# Patient Record
Sex: FEMALE | Race: WHITE | NOT HISPANIC OR LATINO | Employment: PART TIME | URBAN - METROPOLITAN AREA
[De-identification: names, ages, dates, MRNs, and addresses within clinical notes are randomized per-mention and may not be internally consistent; named-entity substitution may affect disease eponyms.]

---

## 2022-08-02 ENCOUNTER — APPOINTMENT (OUTPATIENT)
Dept: CT IMAGING | Facility: CLINIC | Age: 22
End: 2022-08-02
Attending: EMERGENCY MEDICINE
Payer: COMMERCIAL

## 2022-08-02 ENCOUNTER — HOSPITAL ENCOUNTER (EMERGENCY)
Facility: CLINIC | Age: 22
Discharge: HOME | End: 2022-08-03
Attending: EMERGENCY MEDICINE | Admitting: EMERGENCY MEDICINE
Payer: COMMERCIAL

## 2022-08-02 DIAGNOSIS — N10 PYELONEPHRITIS, ACUTE: ICD-10-CM

## 2022-08-02 LAB
ALBUMIN UR-MCNC: 20 MG/DL
APPEARANCE UR: ABNORMAL
BASOPHILS # BLD AUTO: 0 10E3/UL (ref 0–0.2)
BASOPHILS NFR BLD AUTO: 0 %
BILIRUB UR QL STRIP: NEGATIVE
COLOR UR AUTO: YELLOW
EOSINOPHIL # BLD AUTO: 0 10E3/UL (ref 0–0.7)
EOSINOPHIL NFR BLD AUTO: 0 %
ERYTHROCYTE [DISTWIDTH] IN BLOOD BY AUTOMATED COUNT: 11.9 % (ref 10–15)
GLUCOSE UR STRIP-MCNC: NEGATIVE MG/DL
HCG INTACT+B SERPL-ACNC: <1 MIU/ML
HCT VFR BLD AUTO: 46.6 % (ref 35–47)
HGB BLD-MCNC: 14.6 G/DL (ref 11.7–15.7)
HGB UR QL STRIP: NEGATIVE
IMM GRANULOCYTES # BLD: 0.1 10E3/UL
IMM GRANULOCYTES NFR BLD: 0 %
KETONES UR STRIP-MCNC: ABNORMAL MG/DL
LEUKOCYTE ESTERASE UR QL STRIP: ABNORMAL
LYMPHOCYTES # BLD AUTO: 0.8 10E3/UL (ref 0.8–5.3)
LYMPHOCYTES NFR BLD AUTO: 5 %
MCH RBC QN AUTO: 28.7 PG (ref 26.5–33)
MCHC RBC AUTO-ENTMCNC: 31.3 G/DL (ref 31.5–36.5)
MCV RBC AUTO: 92 FL (ref 78–100)
MONOCYTES # BLD AUTO: 0.9 10E3/UL (ref 0–1.3)
MONOCYTES NFR BLD AUTO: 5 %
MUCOUS THREADS #/AREA URNS LPF: PRESENT /LPF
NEUTROPHILS # BLD AUTO: 16 10E3/UL (ref 1.6–8.3)
NEUTROPHILS NFR BLD AUTO: 90 %
NITRATE UR QL: NEGATIVE
NRBC # BLD AUTO: 0 10E3/UL
NRBC BLD AUTO-RTO: 0 /100
PH UR STRIP: 7.5 [PH] (ref 5–7)
PLATELET # BLD AUTO: 237 10E3/UL (ref 150–450)
RBC # BLD AUTO: 5.08 10E6/UL (ref 3.8–5.2)
RBC URINE: 2 /HPF
SP GR UR STRIP: 1.02 (ref 1–1.03)
SQUAMOUS EPITHELIAL: 13 /HPF
UROBILINOGEN UR STRIP-MCNC: NORMAL MG/DL
WBC # BLD AUTO: 17.8 10E3/UL (ref 4–11)
WBC URINE: 48 /HPF

## 2022-08-02 PROCEDURE — 96361 HYDRATE IV INFUSION ADD-ON: CPT

## 2022-08-02 PROCEDURE — 96375 TX/PRO/DX INJ NEW DRUG ADDON: CPT

## 2022-08-02 PROCEDURE — 87086 URINE CULTURE/COLONY COUNT: CPT | Performed by: EMERGENCY MEDICINE

## 2022-08-02 PROCEDURE — 258N000003 HC RX IP 258 OP 636: Performed by: EMERGENCY MEDICINE

## 2022-08-02 PROCEDURE — 81001 URINALYSIS AUTO W/SCOPE: CPT | Performed by: EMERGENCY MEDICINE

## 2022-08-02 PROCEDURE — 250N000011 HC RX IP 250 OP 636: Performed by: EMERGENCY MEDICINE

## 2022-08-02 PROCEDURE — 80053 COMPREHEN METABOLIC PANEL: CPT | Performed by: EMERGENCY MEDICINE

## 2022-08-02 PROCEDURE — 84702 CHORIONIC GONADOTROPIN TEST: CPT | Performed by: EMERGENCY MEDICINE

## 2022-08-02 PROCEDURE — 85025 COMPLETE CBC W/AUTO DIFF WBC: CPT | Performed by: EMERGENCY MEDICINE

## 2022-08-02 PROCEDURE — 74176 CT ABD & PELVIS W/O CONTRAST: CPT

## 2022-08-02 PROCEDURE — 36415 COLL VENOUS BLD VENIPUNCTURE: CPT | Performed by: EMERGENCY MEDICINE

## 2022-08-02 PROCEDURE — 99285 EMERGENCY DEPT VISIT HI MDM: CPT | Mod: 25

## 2022-08-02 RX ORDER — HYDROMORPHONE HYDROCHLORIDE 1 MG/ML
0.5 INJECTION, SOLUTION INTRAMUSCULAR; INTRAVENOUS; SUBCUTANEOUS
Status: DISCONTINUED | OUTPATIENT
Start: 2022-08-02 | End: 2022-08-03 | Stop reason: HOSPADM

## 2022-08-02 RX ORDER — CEFTRIAXONE 1 G/1
1 INJECTION, POWDER, FOR SOLUTION INTRAMUSCULAR; INTRAVENOUS ONCE
Status: COMPLETED | OUTPATIENT
Start: 2022-08-02 | End: 2022-08-03

## 2022-08-02 RX ORDER — SODIUM CHLORIDE 9 MG/ML
INJECTION, SOLUTION INTRAVENOUS CONTINUOUS
Status: DISCONTINUED | OUTPATIENT
Start: 2022-08-02 | End: 2022-08-03 | Stop reason: HOSPADM

## 2022-08-02 RX ORDER — ONDANSETRON 2 MG/ML
4 INJECTION INTRAMUSCULAR; INTRAVENOUS EVERY 30 MIN PRN
Status: DISCONTINUED | OUTPATIENT
Start: 2022-08-02 | End: 2022-08-03 | Stop reason: HOSPADM

## 2022-08-02 RX ADMIN — HYDROMORPHONE HYDROCHLORIDE 0.5 MG: 1 INJECTION, SOLUTION INTRAMUSCULAR; INTRAVENOUS; SUBCUTANEOUS at 22:27

## 2022-08-02 RX ADMIN — ONDANSETRON 4 MG: 2 INJECTION INTRAMUSCULAR; INTRAVENOUS at 22:27

## 2022-08-02 RX ADMIN — SODIUM CHLORIDE 1000 ML: 9 INJECTION, SOLUTION INTRAVENOUS at 22:24

## 2022-08-02 ASSESSMENT — ENCOUNTER SYMPTOMS
VOMITING: 1
FEVER: 1
FLANK PAIN: 1

## 2022-08-03 VITALS
SYSTOLIC BLOOD PRESSURE: 103 MMHG | HEART RATE: 95 BPM | TEMPERATURE: 98.4 F | RESPIRATION RATE: 15 BRPM | OXYGEN SATURATION: 97 % | DIASTOLIC BLOOD PRESSURE: 59 MMHG

## 2022-08-03 LAB
ALBUMIN SERPL BCG-MCNC: 4.7 G/DL (ref 3.5–5.2)
ALP SERPL-CCNC: 83 U/L (ref 35–104)
ALT SERPL W P-5'-P-CCNC: 12 U/L (ref 10–35)
ANION GAP SERPL CALCULATED.3IONS-SCNC: 10 MMOL/L (ref 7–15)
AST SERPL W P-5'-P-CCNC: 17 U/L (ref 10–35)
BILIRUB SERPL-MCNC: 0.4 MG/DL
BUN SERPL-MCNC: 9.3 MG/DL (ref 6–20)
CALCIUM SERPL-MCNC: 9.4 MG/DL (ref 8.6–10)
CHLORIDE SERPL-SCNC: 102 MMOL/L (ref 98–107)
CREAT SERPL-MCNC: 0.72 MG/DL (ref 0.51–0.95)
DEPRECATED HCO3 PLAS-SCNC: 25 MMOL/L (ref 22–29)
GFR SERPL CREATININE-BSD FRML MDRD: >90 ML/MIN/1.73M2
GLUCOSE SERPL-MCNC: 118 MG/DL (ref 70–99)
POTASSIUM SERPL-SCNC: 4.5 MMOL/L (ref 3.4–5.3)
PROT SERPL-MCNC: 7.5 G/DL (ref 6.4–8.3)
SODIUM SERPL-SCNC: 137 MMOL/L (ref 136–145)

## 2022-08-03 PROCEDURE — 96365 THER/PROPH/DIAG IV INF INIT: CPT

## 2022-08-03 PROCEDURE — 250N000011 HC RX IP 250 OP 636: Performed by: EMERGENCY MEDICINE

## 2022-08-03 RX ORDER — CIPROFLOXACIN 500 MG/1
500 TABLET, FILM COATED ORAL 2 TIMES DAILY
Qty: 20 TABLET | Refills: 0 | Status: SHIPPED | OUTPATIENT
Start: 2022-08-03 | End: 2022-08-13

## 2022-08-03 RX ORDER — ONDANSETRON 4 MG/1
4 TABLET, ORALLY DISINTEGRATING ORAL EVERY 6 HOURS PRN
Qty: 10 TABLET | Refills: 0 | Status: SHIPPED | OUTPATIENT
Start: 2022-08-03 | End: 2022-08-06

## 2022-08-03 RX ADMIN — CEFTRIAXONE 1 G: 1 INJECTION, POWDER, FOR SOLUTION INTRAMUSCULAR; INTRAVENOUS at 00:24

## 2022-08-03 NOTE — DISCHARGE INSTRUCTIONS
Your next dose of antibiotics tomorrow morning.  Please drink plenty fluids and rest.  You have been referred to a primary care clinic for follow-up.  Please schedule an appointment next 2 to 3 days.  Return if you have severe pain, high fever, intractable nausea and vomiting or any other concerns.

## 2022-08-03 NOTE — ED TRIAGE NOTES
Pt arrives to ED with generalized lower abd pain that radiates into both sides of her back. Pt thinks this is a kidney stone, but pain was typically on R side. Pt tried ibuprofen this morning without relief. Denies dysuria.

## 2022-08-03 NOTE — ED PROVIDER NOTES
History   Chief Complaint:  Abdominal Pain and Flank Pain       The history is provided by the patient.      Hilda Escamilla is a 22 year old female who presents with flank pain.The patient reports last night onset of flank pain and abdominal pain that radiates on both sides of her back. She states when she lived in [place] she had 2 kidney stones that she passed and one kidney stone she had surgery and a stent placed. She notes that she took ibuprofen at 0900 this morning but feel like it did not relieve the pain. She adds she vomited and may have had a fever. She denies possibility of being pregnant or any allergies.     Review of Systems   Constitutional: Positive for fever.   Gastrointestinal: Positive for vomiting.   Genitourinary: Positive for flank pain.   All other systems reviewed and are negative.    Allergies:  No Known Allergies    Medications:  No current outpatient medications on file.    Past Medical History:     There is no problem list on file for this patient.     Social History:  Patient presents with family member.   Patient arrives via private vehicle.     Physical Exam     Patient Vitals for the past 24 hrs:   BP Temp Temp src Pulse Resp SpO2   08/02/22 2012 (!) 146/83 -- -- (!) 121 20 100 %   08/02/22 2011 -- 98.4  F (36.9  C) Temporal -- 20 --       Physical Exam  Constitutional:       Appearance: She is well-developed.   HENT:      Right Ear: External ear normal.      Left Ear: External ear normal.      Mouth/Throat:      Mouth: Mucous membranes are dry.      Pharynx: Oropharynx is clear. No oropharyngeal exudate or posterior oropharyngeal erythema.   Eyes:      General: No scleral icterus.     Extraocular Movements: Extraocular movements intact.      Conjunctiva/sclera: Conjunctivae normal.      Pupils: Pupils are equal, round, and reactive to light.   Cardiovascular:      Rate and Rhythm: Normal rate and regular rhythm.      Heart sounds: Normal heart sounds. No murmur  heard.    No friction rub. No gallop.   Pulmonary:      Effort: Pulmonary effort is normal. No respiratory distress.      Breath sounds: Normal breath sounds. No wheezing or rales.   Abdominal:      General: Bowel sounds are normal. There is no distension.      Palpations: Abdomen is soft. There is no mass.      Tenderness: There is abdominal tenderness.      Comments: Mild suprapubic TTP. Mild bilateral flank TTP in the lower flanks   Musculoskeletal:         General: Normal range of motion.      Cervical back: Normal range of motion and neck supple.   Lymphadenopathy:      Cervical: No cervical adenopathy.   Skin:     General: Skin is warm and dry.      Capillary Refill: Capillary refill takes less than 2 seconds.      Findings: No rash.   Neurological:      Mental Status: She is alert.         Emergency Department Course     Imaging:  CT Abdomen Pelvis w/o Contrast   Final Result   IMPRESSION:    1.  No hydronephrosis or obstructing urinary tract calculus. Please note that noncontrast CT is insensitive to evaluate for changes of pyelonephritis.           Report per radiology    Laboratory:  Labs Ordered and Resulted from Time of ED Arrival to Time of ED Departure   ROUTINE UA WITH MICROSCOPIC REFLEX TO CULTURE - Abnormal       Result Value    Color Urine Yellow      Appearance Urine Slightly Cloudy (*)     Glucose Urine Negative      Bilirubin Urine Negative      Ketones Urine Trace (*)     Specific Gravity Urine 1.022      Blood Urine Negative      pH Urine 7.5 (*)     Protein Albumin Urine 20  (*)     Urobilinogen Urine Normal      Nitrite Urine Negative      Leukocyte Esterase Urine Large (*)     Mucus Urine Present (*)     RBC Urine 2      WBC Urine 48 (*)     Squamous Epithelials Urine 13 (*)    COMPREHENSIVE METABOLIC PANEL - Abnormal    Sodium 137      Potassium 4.5      Creatinine 0.72      Urea Nitrogen 9.3      Chloride 102      Carbon Dioxide (CO2) 25      Anion Gap 10      Glucose 118 (*)     Calcium  9.4      Protein Total 7.5      Albumin 4.7      Bilirubin Total 0.4      Alkaline Phosphatase 83      AST 17      ALT 12      GFR Estimate >90     CBC WITH PLATELETS AND DIFFERENTIAL - Abnormal    WBC Count 17.8 (*)     RBC Count 5.08      Hemoglobin 14.6      Hematocrit 46.6      MCV 92      MCH 28.7      MCHC 31.3 (*)     RDW 11.9      Platelet Count 237      % Neutrophils 90      % Lymphocytes 5      % Monocytes 5      % Eosinophils 0      % Basophils 0      % Immature Granulocytes 0      NRBCs per 100 WBC 0      Absolute Neutrophils 16.0 (*)     Absolute Lymphocytes 0.8      Absolute Monocytes 0.9      Absolute Eosinophils 0.0      Absolute Basophils 0.0      Absolute Immature Granulocytes 0.1      Absolute NRBCs 0.0     HCG QUANTITATIVE PREGNANCY - Normal    hCG Quantitative <1     URINE CULTURE          Emergency Department Course:    Reviewed:  I reviewed nursing notes, vitals and past medical history.    Assessments:  2225 I obtained history and examined the patient as noted above.   2348 I rechecked the patient and explained findings.     Interventions:  2224 NS, 1000 mL, IV  2227 Zofran, 4 mg, IV  2227 Dilaudid, 0.5 mg, IV  0024 Rocephin, 1 g, IV    Disposition:  The patient was discharged to home.     Impression & Plan       Medical Decision Making:  Patient presents today for evaluation of flank pain as well as urinary concerns.  Her labs did show infection her urine.  Her CT did not show any signs of kidney stone.  This is most likely a kidney infection given the degree of pain.  She was advised to stay rested and push fluids.  She is willing to try this as an outpatient.  She is given prescription for Zofran for nausea and Cipro for antibiotics.  Return precautions were reviewed in detail with her.  She voiced understanding.      Diagnosis:    ICD-10-CM    1. Pyelonephritis, acute  N10        Discharge Medications:  New Prescriptions    CIPROFLOXACIN (CIPRO) 500 MG TABLET    Take 1 tablet (500 mg) by  mouth 2 times daily for 10 days    ONDANSETRON (ZOFRAN ODT) 4 MG ODT TAB    Take 1 tablet (4 mg) by mouth every 6 hours as needed for nausea       Scribe Disclosure:  I, Fara Currie, am serving as a scribe at 10:22 PM on 8/2/2022 to document services personally performed by Antonia Gutierrez MD based on my observations and the provider's statements to me.            Antonia Gutierrez MD  08/03/22 0059

## 2022-08-04 LAB — BACTERIA UR CULT: NORMAL

## 2022-08-04 NOTE — RESULT ENCOUNTER NOTE
Final urine culture report is negative.  Adult Negative Urine culture parameters per protocol: Any # Urogenital single or mixed organism, <10,000 col/ml single organism (cath/midstream), and > 3 organisms (No susceptibilities performed).  [facility] Emergency Dept discharge antibiotic prescribed (If applicable): Ciprofloxacin  Treatment recommendations per [facility] ED Lab Result Urine Culture protocol.

## 2023-02-12 ENCOUNTER — HEALTH MAINTENANCE LETTER (OUTPATIENT)
Age: 23
End: 2023-02-12

## 2023-08-11 ENCOUNTER — HOSPITAL ENCOUNTER (EMERGENCY)
Facility: CLINIC | Age: 23
Discharge: HOME | End: 2023-08-11
Attending: EMERGENCY MEDICINE | Admitting: EMERGENCY MEDICINE
Payer: COMMERCIAL

## 2023-08-11 ENCOUNTER — APPOINTMENT (OUTPATIENT)
Dept: CT IMAGING | Facility: CLINIC | Age: 23
End: 2023-08-11
Attending: EMERGENCY MEDICINE
Payer: COMMERCIAL

## 2023-08-11 VITALS
OXYGEN SATURATION: 99 % | DIASTOLIC BLOOD PRESSURE: 87 MMHG | TEMPERATURE: 97 F | HEART RATE: 70 BPM | SYSTOLIC BLOOD PRESSURE: 147 MMHG | RESPIRATION RATE: 18 BRPM

## 2023-08-11 DIAGNOSIS — N12 PYELONEPHRITIS: ICD-10-CM

## 2023-08-11 DIAGNOSIS — N39.0 URINARY TRACT INFECTION WITHOUT HEMATURIA, SITE UNSPECIFIED: ICD-10-CM

## 2023-08-11 LAB
ALBUMIN SERPL BCG-MCNC: 4.6 G/DL (ref 3.5–5.2)
ALBUMIN UR-MCNC: 20 MG/DL
ALP SERPL-CCNC: 72 U/L (ref 35–104)
ALT SERPL W P-5'-P-CCNC: 12 U/L (ref 0–50)
ANION GAP SERPL CALCULATED.3IONS-SCNC: 10 MMOL/L (ref 7–15)
APPEARANCE UR: ABNORMAL
AST SERPL W P-5'-P-CCNC: 19 U/L (ref 0–45)
BACTERIA #/AREA URNS HPF: ABNORMAL /HPF
BASOPHILS # BLD AUTO: 0 10E3/UL (ref 0–0.2)
BASOPHILS NFR BLD AUTO: 0 %
BILIRUB SERPL-MCNC: 0.5 MG/DL
BILIRUB UR QL STRIP: NEGATIVE
BUN SERPL-MCNC: 9.6 MG/DL (ref 6–20)
CALCIUM SERPL-MCNC: 10.2 MG/DL (ref 8.6–10)
CHLORIDE SERPL-SCNC: 104 MMOL/L (ref 98–107)
COLOR UR AUTO: YELLOW
CREAT SERPL-MCNC: 0.77 MG/DL (ref 0.51–0.95)
DEPRECATED HCO3 PLAS-SCNC: 25 MMOL/L (ref 22–29)
EOSINOPHIL # BLD AUTO: 0.2 10E3/UL (ref 0–0.7)
EOSINOPHIL NFR BLD AUTO: 2 %
ERYTHROCYTE [DISTWIDTH] IN BLOOD BY AUTOMATED COUNT: 12.4 % (ref 10–15)
GFR SERPL CREATININE-BSD FRML MDRD: >90 ML/MIN/1.73M2
GLUCOSE SERPL-MCNC: 89 MG/DL (ref 70–99)
GLUCOSE UR STRIP-MCNC: NEGATIVE MG/DL
HCG SER QL IA.RAPID: NEGATIVE
HCT VFR BLD AUTO: 43.2 % (ref 35–47)
HGB BLD-MCNC: 14.3 G/DL (ref 11.7–15.7)
HGB UR QL STRIP: NEGATIVE
HOLD SPECIMEN: NORMAL
IMM GRANULOCYTES # BLD: 0 10E3/UL
IMM GRANULOCYTES NFR BLD: 0 %
KETONES UR STRIP-MCNC: 40 MG/DL
LEUKOCYTE ESTERASE UR QL STRIP: ABNORMAL
LIPASE SERPL-CCNC: 16 U/L (ref 13–60)
LYMPHOCYTES # BLD AUTO: 1.9 10E3/UL (ref 0.8–5.3)
LYMPHOCYTES NFR BLD AUTO: 24 %
MAGNESIUM SERPL-MCNC: 2 MG/DL (ref 1.7–2.3)
MCH RBC QN AUTO: 29.3 PG (ref 26.5–33)
MCHC RBC AUTO-ENTMCNC: 33.1 G/DL (ref 31.5–36.5)
MCV RBC AUTO: 89 FL (ref 78–100)
MONOCYTES # BLD AUTO: 0.6 10E3/UL (ref 0–1.3)
MONOCYTES NFR BLD AUTO: 7 %
MUCOUS THREADS #/AREA URNS LPF: PRESENT /LPF
NEUTROPHILS # BLD AUTO: 5.2 10E3/UL (ref 1.6–8.3)
NEUTROPHILS NFR BLD AUTO: 67 %
NITRATE UR QL: NEGATIVE
NRBC # BLD AUTO: 0 10E3/UL
NRBC BLD AUTO-RTO: 0 /100
PH UR STRIP: 6.5 [PH] (ref 5–7)
PLATELET # BLD AUTO: 234 10E3/UL (ref 150–450)
POTASSIUM SERPL-SCNC: 4.5 MMOL/L (ref 3.4–5.3)
PROT SERPL-MCNC: 7.6 G/DL (ref 6.4–8.3)
RBC # BLD AUTO: 4.88 10E6/UL (ref 3.8–5.2)
RBC URINE: 5 /HPF
SODIUM SERPL-SCNC: 139 MMOL/L (ref 136–145)
SP GR UR STRIP: 1.02 (ref 1–1.03)
SQUAMOUS EPITHELIAL: 5 /HPF
UROBILINOGEN UR STRIP-MCNC: 2 MG/DL
WBC # BLD AUTO: 7.8 10E3/UL (ref 4–11)
WBC URINE: 68 /HPF

## 2023-08-11 PROCEDURE — 99285 EMERGENCY DEPT VISIT HI MDM: CPT | Mod: 25

## 2023-08-11 PROCEDURE — 87088 URINE BACTERIA CULTURE: CPT | Performed by: EMERGENCY MEDICINE

## 2023-08-11 PROCEDURE — 96365 THER/PROPH/DIAG IV INF INIT: CPT | Mod: 59

## 2023-08-11 PROCEDURE — 96361 HYDRATE IV INFUSION ADD-ON: CPT

## 2023-08-11 PROCEDURE — 80053 COMPREHEN METABOLIC PANEL: CPT | Performed by: EMERGENCY MEDICINE

## 2023-08-11 PROCEDURE — 258N000003 HC RX IP 258 OP 636: Performed by: EMERGENCY MEDICINE

## 2023-08-11 PROCEDURE — 250N000011 HC RX IP 250 OP 636: Mod: JZ | Performed by: EMERGENCY MEDICINE

## 2023-08-11 PROCEDURE — 84703 CHORIONIC GONADOTROPIN ASSAY: CPT

## 2023-08-11 PROCEDURE — 74177 CT ABD & PELVIS W/CONTRAST: CPT

## 2023-08-11 PROCEDURE — 83735 ASSAY OF MAGNESIUM: CPT | Performed by: EMERGENCY MEDICINE

## 2023-08-11 PROCEDURE — 96375 TX/PRO/DX INJ NEW DRUG ADDON: CPT

## 2023-08-11 PROCEDURE — 85025 COMPLETE CBC W/AUTO DIFF WBC: CPT | Performed by: EMERGENCY MEDICINE

## 2023-08-11 PROCEDURE — 83690 ASSAY OF LIPASE: CPT | Performed by: EMERGENCY MEDICINE

## 2023-08-11 PROCEDURE — 250N000011 HC RX IP 250 OP 636: Performed by: EMERGENCY MEDICINE

## 2023-08-11 PROCEDURE — 36415 COLL VENOUS BLD VENIPUNCTURE: CPT | Performed by: EMERGENCY MEDICINE

## 2023-08-11 PROCEDURE — 81001 URINALYSIS AUTO W/SCOPE: CPT | Performed by: EMERGENCY MEDICINE

## 2023-08-11 RX ORDER — CEFPODOXIME PROXETIL 200 MG/1
200 TABLET, FILM COATED ORAL 2 TIMES DAILY
Qty: 28 TABLET | Refills: 0 | Status: SHIPPED | OUTPATIENT
Start: 2023-08-11 | End: 2023-08-25

## 2023-08-11 RX ORDER — IOPAMIDOL 755 MG/ML
500 INJECTION, SOLUTION INTRAVASCULAR ONCE
Status: COMPLETED | OUTPATIENT
Start: 2023-08-11 | End: 2023-08-11

## 2023-08-11 RX ORDER — CEFTRIAXONE 1 G/1
1 INJECTION, POWDER, FOR SOLUTION INTRAMUSCULAR; INTRAVENOUS ONCE
Status: COMPLETED | OUTPATIENT
Start: 2023-08-11 | End: 2023-08-11

## 2023-08-11 RX ORDER — ONDANSETRON 2 MG/ML
4 INJECTION INTRAMUSCULAR; INTRAVENOUS ONCE
Status: COMPLETED | OUTPATIENT
Start: 2023-08-11 | End: 2023-08-11

## 2023-08-11 RX ADMIN — IOPAMIDOL 100 ML: 755 INJECTION, SOLUTION INTRAVENOUS at 11:28

## 2023-08-11 RX ADMIN — SODIUM CHLORIDE 1000 ML: 9 INJECTION, SOLUTION INTRAVENOUS at 10:14

## 2023-08-11 RX ADMIN — CEFTRIAXONE 1 G: 1 INJECTION, POWDER, FOR SOLUTION INTRAMUSCULAR; INTRAVENOUS at 12:31

## 2023-08-11 RX ADMIN — SODIUM CHLORIDE 100 ML: 9 INJECTION, SOLUTION INTRAVENOUS at 11:28

## 2023-08-11 RX ADMIN — ONDANSETRON 4 MG: 2 INJECTION INTRAMUSCULAR; INTRAVENOUS at 10:13

## 2023-08-11 ASSESSMENT — ACTIVITIES OF DAILY LIVING (ADL)
ADLS_ACUITY_SCORE: 35
ADLS_ACUITY_SCORE: 35

## 2023-08-11 NOTE — ED PROVIDER NOTES
History     Chief Complaint:  Nausea & Vomiting and Abdominal Pain     HPI   Hilda Escamilla is a 23 year old female with a history of PCOS who presents for evaluation of nausea, vomiting, and abdominal pain. The patient reports that for the past five months, she has had abdominal pain, nausea, vomiting, and cramping after waking up every morning. States that her abdominal pain is mostly in the right upper and right lower quadrant, but today is in the lower abd. Upon arrival to the emergency department, her pain was a 5.5/10 but has decreased to a 3/10 on the severity scale. Notes that her pain has been significantly worse this week leading her to go to urgent care twice and miss three days of work. Adds that she has been taking Zofran to manage her nausea and has a gastroenterology appointment in a few weeks. Reports her last menstrual period was on 7/25/23 and was heavier than normal with worse cramps. Denies urinary symptoms, fever, vaginal bleeding, vaginal discharge, and history of abdominal surgery.     Independent Historian:   None - Patient Only    Review of External Notes:   I reviewed laboratory work (UA and blood) from the patient's urgent care visit earlier this week.     Medications:    Ondansetron    Past Medical History:    PCOS    Physical Exam   Patient Vitals for the past 24 hrs:   BP Temp Temp src Pulse Resp SpO2   08/11/23 0906 (!) 138/100 97  F (36.1  C) Temporal 98 18 100 %      Physical Exam  Vitals reviewed.   Constitutional:       General: She is not in acute distress.     Appearance: She is not ill-appearing.   HENT:      Head: Normocephalic and atraumatic.   Eyes:      Extraocular Movements: Extraocular movements intact.   Cardiovascular:      Rate and Rhythm: Normal rate and regular rhythm.   Pulmonary:      Effort: Pulmonary effort is normal. No respiratory distress.      Breath sounds: Normal breath sounds. No wheezing.   Abdominal:      Palpations: Abdomen is soft.       Tenderness: There is no abdominal tenderness. There is no right CVA tenderness, left CVA tenderness or guarding.   Musculoskeletal:      Cervical back: Normal range of motion.   Skin:     General: Skin is warm and dry.      Capillary Refill: Capillary refill takes less than 2 seconds.   Neurological:      Mental Status: She is alert and oriented to person, place, and time.      GCS: GCS eye subscore is 4. GCS verbal subscore is 5. GCS motor subscore is 6.   Psychiatric:         Behavior: Behavior normal.           Emergency Department Course   Imaging:  CT Abdomen Pelvis w Contrast   Final Result   IMPRESSION: Findings consistent with cystitis and bilateral, left   greater than right pyelonephritis. No evidence of renal abscess.      LUIS GERBER MD            SYSTEM ID:  [id number]         Report per radiology    Laboratory:  Labs Ordered and Resulted from Time of ED Arrival to Time of ED Departure   COMPREHENSIVE METABOLIC PANEL - Abnormal       Result Value    Sodium 139      Potassium 4.5      Chloride 104      Carbon Dioxide (CO2) 25      Anion Gap 10      Urea Nitrogen 9.6      Creatinine 0.77      Calcium 10.2 (*)     Glucose 89      Alkaline Phosphatase 72      AST 19      ALT 12      Protein Total 7.6      Albumin 4.6      Bilirubin Total 0.5      GFR Estimate >90     ROUTINE UA WITH MICROSCOPIC REFLEX TO CULTURE - Abnormal    Color Urine Yellow      Appearance Urine Slightly Cloudy (*)     Glucose Urine Negative      Bilirubin Urine Negative      Ketones Urine 40 (*)     Specific Gravity Urine 1.021      Blood Urine Negative      pH Urine 6.5      Protein Albumin Urine 20 (*)     Urobilinogen Urine 2.0      Nitrite Urine Negative      Leukocyte Esterase Urine Large (*)     Bacteria Urine Few (*)     Mucus Urine Present (*)     RBC Urine 5 (*)     WBC Urine 68 (*)     Squamous Epithelials Urine 5 (*)    LIPASE - Normal    Lipase 16     MAGNESIUM - Normal    Magnesium 2.0     ISTAT HCG QUALITATIVE  PREGNANCY POCT - Normal    HCG Qualitative POCT Negative     CBC WITH PLATELETS AND DIFFERENTIAL    WBC Count 7.8      RBC Count 4.88      Hemoglobin 14.3      Hematocrit 43.2      MCV 89      MCH 29.3      MCHC 33.1      RDW 12.4      Platelet Count 234      % Neutrophils 67      % Lymphocytes 24      % Monocytes 7      % Eosinophils 2      % Basophils 0      % Immature Granulocytes 0      NRBCs per 100 WBC 0      Absolute Neutrophils 5.2      Absolute Lymphocytes 1.9      Absolute Monocytes 0.6      Absolute Eosinophils 0.2      Absolute Basophils 0.0      Absolute Immature Granulocytes 0.0      Absolute NRBCs 0.0     URINE CULTURE      Emergency Department Course & Assessments:      Interventions:  Medications   0.9% sodium chloride BOLUS (0 mLs Intravenous Stopped 8/11/23 1302)   ondansetron (ZOFRAN) injection 4 mg (4 mg Intravenous $Given 8/11/23 1013)   cefTRIAXone (ROCEPHIN) 1 g vial to attach to  mL bag for ADULTS or NS 50 mL bag for PEDS (0 g Intravenous Stopped 8/11/23 1302)   0.9% sodium chloride BOLUS (0 mLs Intravenous Stopped 8/11/23 1231)   iopamidol (ISOVUE-370) solution 500 mL (100 mLs Intravenous $Given 8/11/23 1128)      Assessments:  0955 I obtained history and examined the patient as noted above.   1214 I rechecked and updated the patient.   1232 I rechecked and updated the patient.   1301 I rechecked and updated the patient.     Independent Interpretation (X-rays, CTs, rhythm strip):  None    Consultations/Discussion of Management or Tests:   ED Course as of 08/11/23 1305   Fri Aug 11, 2023   1024 Hemoglobin: 14.3   1102 UA shows sign of infection and ketones. IVF in process. Will give CTX   1223 Updated pt on plan for abx, pending imaging results   1231 CT abd pelvis shows pyelonephritis and cystitis.  Rocephin has been started.  No evidence of any INDERJIT, no leukocytosis.  Patient has been afebrile ER and well-appearing.  Will p.o. challenge   1235 PO challenge in process.    1235 Updated  patient on results I discussed with her admission versus discharge.  I discussed with her at this time she is hemodynamically stable, no signs of sepsis she is afebrile, no leukocytosis.  Discussed plan for p.o. challenge.  Explained to her if she is not able to tolerate p.o. she may need to be admitted to the hospital.  Will reassess.     Social Determinants of Health affecting care:   None    Disposition:  The patient was discharged to home.     Impression & Plan    Medical Decision Makin-year-old female presenting today with abdominal pain, nausea, vomiting.  Patient states that she has had ongoing nausea, vomiting, loose stools for months.  She was seen at urgent care earlier this week was prescribed Zofran and referred to GI.  She states that she has had mostly upper abdominal pain but today states that she has lower abdominal pain.  On exam her abdomen is soft and nontender throughout.  On review of her chart she was seen previously in the ER for flank pain.  CT scan was done that showed cystitis, pyelonephritis.  At this time she is no CVA tenderness.  No leukocytosis.  No signs of any acute kidney injury.  UA does show signs of infection.  Was given dose of Rocephin here.  P.o. challenge done in the ER patient tolerate p.o. without any difficulty.  No episodes of vomiting while in the ER.  Patient states that she is comfortable discharge home.  Discussed plan for p.o. antibiotics, close follow-up PCP.  Recommend that she continue plan for follow-up with GI as she has had ongoing vomiting for the last several months.  Discussed tricked return precautions clued any fevers, uncontrolled pain, inability tolerate p.o. or any concerning symptoms.      Diagnosis:    ICD-10-CM    1. Pyelonephritis  N12       2. Urinary tract infection without hematuria, site unspecified  N39.0            Discharge Medications:  New Prescriptions    CEFPODOXIME (VANTIN) 200 MG TABLET    Take 1 tablet (200 mg) by mouth 2 times  daily for 14 days      Scribe Disclosure:  I, Luisa Delvalle, am serving as a scribe at 9:45 AM on 8/11/2023 to document services personally performed by Ioana Hicks DO based on my observations and the provider's statements to me.     8/11/2023   Ioana Hicks DO Doan, Tiffani, DO  08/11/23 1311

## 2023-08-11 NOTE — ED TRIAGE NOTES
Pt arrives with c/o ongoing issues with nausea and vomiting for 5 months. Pt reports sometimes she will be nauseous all day, sometimes just in the morning. Pt reports abdominal pain as well. Pt has been seen at  multiple times for the same. Pt reports she presented to ER today d/t vomiting 3 times this AM.

## 2023-08-11 NOTE — Clinical Note
Hilda Escamilla was seen and treated in our emergency department on 8/11/2023.  She may return to work on 08/14/2023.       If you have any questions or concerns, please don't hesitate to call.      Ioana Hicks DO

## 2023-08-12 LAB — BACTERIA UR CULT: ABNORMAL

## 2023-08-12 NOTE — RESULT ENCOUNTER NOTE
[facility] Emergency Dept discharge antibiotic (if prescribed): Cefpodoxime (Vantin) 200 MG tablet, 1 tablet (200 mg) by mouth 2 times daily for 14 days   Date of Rx (if applicable):  8/11/23  No changes in treatment per [facility] ED Lab Result Urine culture protocol.

## 2023-08-21 ENCOUNTER — ANCILLARY ORDERS (OUTPATIENT)
Dept: NUCLEAR MEDICINE | Facility: CLINIC | Age: 23
End: 2023-08-21

## 2023-08-21 DIAGNOSIS — R11.2 NAUSEA AND VOMITING IN ADULT: Primary | ICD-10-CM

## 2023-08-21 DIAGNOSIS — K52.9 CHRONIC DIARRHEA: ICD-10-CM

## 2023-10-16 ENCOUNTER — HOSPITAL ENCOUNTER (EMERGENCY)
Facility: CLINIC | Age: 23
Discharge: HOME | End: 2023-10-16
Attending: STUDENT IN AN ORGANIZED HEALTH CARE EDUCATION/TRAINING PROGRAM | Admitting: STUDENT IN AN ORGANIZED HEALTH CARE EDUCATION/TRAINING PROGRAM
Payer: COMMERCIAL

## 2023-10-16 VITALS
OXYGEN SATURATION: 100 % | HEART RATE: 86 BPM | DIASTOLIC BLOOD PRESSURE: 98 MMHG | RESPIRATION RATE: 16 BRPM | TEMPERATURE: 97.1 F | SYSTOLIC BLOOD PRESSURE: 139 MMHG

## 2023-10-16 DIAGNOSIS — K92.0 HEMATEMESIS WITH NAUSEA: Primary | ICD-10-CM

## 2023-10-16 DIAGNOSIS — R10.13 EPIGASTRIC PAIN: ICD-10-CM

## 2023-10-16 DIAGNOSIS — R11.2 NAUSEA AND VOMITING, UNSPECIFIED VOMITING TYPE: ICD-10-CM

## 2023-10-16 LAB
ANION GAP SERPL CALCULATED.3IONS-SCNC: 11 MMOL/L (ref 7–15)
BUN SERPL-MCNC: 8.5 MG/DL (ref 6–20)
CALCIUM SERPL-MCNC: 9.6 MG/DL (ref 8.6–10)
CHLORIDE SERPL-SCNC: 104 MMOL/L (ref 98–107)
CREAT SERPL-MCNC: 0.68 MG/DL (ref 0.51–0.95)
DEPRECATED HCO3 PLAS-SCNC: 25 MMOL/L (ref 22–29)
EGFRCR SERPLBLD CKD-EPI 2021: >90 ML/MIN/1.73M2
ERYTHROCYTE [DISTWIDTH] IN BLOOD BY AUTOMATED COUNT: 12.1 % (ref 10–15)
GLUCOSE SERPL-MCNC: 106 MG/DL (ref 70–99)
HCT VFR BLD AUTO: 43.9 % (ref 35–47)
HGB BLD-MCNC: 14.4 G/DL (ref 11.7–15.7)
MCH RBC QN AUTO: 29.5 PG (ref 26.5–33)
MCHC RBC AUTO-ENTMCNC: 32.8 G/DL (ref 31.5–36.5)
MCV RBC AUTO: 90 FL (ref 78–100)
PLATELET # BLD AUTO: 232 10E3/UL (ref 150–450)
POTASSIUM SERPL-SCNC: 4.2 MMOL/L (ref 3.4–5.3)
RBC # BLD AUTO: 4.88 10E6/UL (ref 3.8–5.2)
SODIUM SERPL-SCNC: 140 MMOL/L (ref 135–145)
WBC # BLD AUTO: 8.1 10E3/UL (ref 4–11)

## 2023-10-16 PROCEDURE — 80048 BASIC METABOLIC PNL TOTAL CA: CPT | Performed by: STUDENT IN AN ORGANIZED HEALTH CARE EDUCATION/TRAINING PROGRAM

## 2023-10-16 PROCEDURE — 36415 COLL VENOUS BLD VENIPUNCTURE: CPT | Performed by: STUDENT IN AN ORGANIZED HEALTH CARE EDUCATION/TRAINING PROGRAM

## 2023-10-16 PROCEDURE — 96374 THER/PROPH/DIAG INJ IV PUSH: CPT

## 2023-10-16 PROCEDURE — 99284 EMERGENCY DEPT VISIT MOD MDM: CPT | Mod: 25

## 2023-10-16 PROCEDURE — 85027 COMPLETE CBC AUTOMATED: CPT | Performed by: STUDENT IN AN ORGANIZED HEALTH CARE EDUCATION/TRAINING PROGRAM

## 2023-10-16 PROCEDURE — 250N000011 HC RX IP 250 OP 636: Mod: JZ | Performed by: STUDENT IN AN ORGANIZED HEALTH CARE EDUCATION/TRAINING PROGRAM

## 2023-10-16 RX ORDER — METOCLOPRAMIDE HYDROCHLORIDE 5 MG/ML
5 INJECTION INTRAMUSCULAR; INTRAVENOUS ONCE
Status: COMPLETED | OUTPATIENT
Start: 2023-10-16 | End: 2023-10-16

## 2023-10-16 RX ORDER — METOCLOPRAMIDE 10 MG/1
10 TABLET ORAL 2 TIMES DAILY PRN
Qty: 20 TABLET | Refills: 0 | Status: SHIPPED | OUTPATIENT
Start: 2023-10-16 | End: 2023-10-26

## 2023-10-16 RX ADMIN — METOCLOPRAMIDE HYDROCHLORIDE 5 MG: 5 INJECTION INTRAMUSCULAR; INTRAVENOUS at 06:49

## 2023-10-16 ASSESSMENT — ACTIVITIES OF DAILY LIVING (ADL): ADLS_ACUITY_SCORE: 35

## 2023-10-16 NOTE — ED TRIAGE NOTES
Patient woke up this am at approx 330 and threw up twice.  Patient has been having vomiting episodes in the mornings for the past 7 months and has seen GI and been scoped with no identified cause of the vomiting.  This am when she threw up she noted bright red blood in her vomit which is new.  Also complaining of some dizziness and lightheadedness.     Triage Assessment (Adult)       Row Name 10/16/23 0611          Triage Assessment    Airway WDL WDL        Respiratory WDL    Respiratory WDL WDL        Skin Circulation/Temperature WDL    Skin Circulation/Temperature WDL WDL        Cardiac WDL    Cardiac WDL WDL        Peripheral/Neurovascular WDL    Peripheral Neurovascular WDL WDL        Cognitive/Neuro/Behavioral WDL    Cognitive/Neuro/Behavioral WDL WDL

## 2023-10-16 NOTE — DISCHARGE INSTRUCTIONS
Thank you for allowing us to evaluate you today.  Follow up with GI  for reevaluation.. Take the metoclopramide as prescribed for nausea.  Please read the guidance provided with your discharge instructions.  Immediately return to the emergency department with any concerns.

## 2023-10-16 NOTE — Clinical Note
Hilda Richardson Andi was seen and treated in our emergency department on 10/16/2023.  She may return to work on 10/17/2023.       If you have any questions or concerns, please don't hesitate to call.      Bertin Saenz MD

## 2023-10-16 NOTE — LETTER
October 16, 2023      To Whom It May Concern:      Hilda Escamilla was seen in our Emergency Department today, 10/16/23.  I expect her condition to improve over the next 1-2 days.  She may return to work/school when improved.    Sincerely,        ROSY Bruce

## 2023-10-16 NOTE — ED PROVIDER NOTES
History   Chief Complaint:  Hematemesis       HPI:  Hilda Escamilla is a very pleasant 23 year old female presenting with hematemesis.  Patient has had morning nausea, vomiting, upper abdominal pain, diarrhea for about 7 months.  She is seeing GI about this and has had an endoscopy.  She has previously been worked in this emergency department for this complaint.  Today, she woke up with her normal symptoms but when she vomited that was some bright red blood in her vomit.  She also reports some black tarry stool with her diarrhea over the last 7 months.  Recent endoscopy was normal.  She does have follow-up with GI scheduled.  She is taking omeprazole daily but has not noticed significant improvement.  She takes Zofran but this does not have much effect on her vomiting.  No new symptoms from normal.  Specifically, no urinary symptoms.  No fever or chills.  No abnormal abdominal pain.  She had her last period 2 weeks ago.  She does not believe she is pregnant.    Independent Historian:   None - Patient Only    Review of External Notes:   None.    Medications:    metoclopramide (REGLAN) 10 MG tablet        Past Medical History:    No past medical history on file.    Past Surgical History:    No past surgical history on file.     Physical Exam   Patient Vitals for the past 24 hrs:   BP Temp Temp src Pulse Resp SpO2   10/16/23 0609 (!) 139/98 97.1  F (36.2  C) Temporal 86 16 100 %      Physical Exam  Vitals reviewed.   Constitutional:       General: She is not in acute distress.     Appearance: Normal appearance. She is not ill-appearing.   Cardiovascular:      Rate and Rhythm: Normal rate.   Pulmonary:      Effort: Pulmonary effort is normal.   Abdominal:      General: Abdomen is flat.      Palpations: Abdomen is soft.      Tenderness: There is abdominal tenderness (Mild epigastric). There is no guarding or rebound.   Skin:     General: Skin is warm and dry.      Coloration: Skin is not pale.   Neurological:       Mental Status: She is alert and oriented to person, place, and time.            Emergency Department Course   ECG  None performed    Imaging:  No orders to display      None performed    Laboratory:  Labs Ordered and Resulted from Time of ED Arrival to Time of ED Departure   BASIC METABOLIC PANEL - Abnormal       Result Value    Sodium 140      Potassium 4.2      Chloride 104      Carbon Dioxide (CO2) 25      Anion Gap 11      Urea Nitrogen 8.5      Creatinine 0.68      GFR Estimate >90      Calcium 9.6      Glucose 106 (*)    CBC WITH PLATELETS - Normal    WBC Count 8.1      RBC Count 4.88      Hemoglobin 14.4      Hematocrit 43.9      MCV 90      MCH 29.5      MCHC 32.8      RDW 12.1      Platelet Count 232          Procedures   None performed       Emergency Department Course & Assessments:             Interventions:  Medications   metoclopramide (REGLAN) injection 5 mg (5 mg Intravenous $Given 10/16/23 0649)        Assessments:  0630 I assessed the patient and obtained additional history    Independent Interpretation (X-rays, CTs, rhythm strip):  None    Consultations/Discussion of Management or Tests:  None    Social Determinants of Health affecting care:   None.      Disposition:  The patient was discharged to home.     Impression & Plan   CMS Diagnoses: None       Medical Decision Making:   Patient presenting with hematemesis.  Patient's other symptoms seem to be at her baseline.  Uncertain etiology but she is being followed closely by [facility].  Suspect gastritis versus Isidra-Manrique tear versus peptic ulcer disease.  The latter seems less likely given patient did have a recent reassuring endoscopy.  Will evaluate with CBC and BMP.  Also provide Reglan as alternative antiemetic.    Patient had good response to Reglan, improved and nausea better than Zofran.  Will send home with prescription for this.  CBC was reassuring against acute blood loss anemia.  BMP showed no elevation in BUN suggestive of ongoing GI  bleed.  We will plan for patient to follow-up with GI for further evaluation.    Critical Care time was 0 minutes for this patient excluding procedures.       Diagnosis:    ICD-10-CM    1. Hematemesis with nausea  K92.0       2. Nausea and vomiting, unspecified vomiting type  R11.2       3. Epigastric pain  R10.13            Discharge Medications:  New Prescriptions    METOCLOPRAMIDE (REGLAN) 10 MG TABLET    Take 1 tablet (10 mg) by mouth 2 times daily as needed (nausea)         Bertin Saenz MD  10/16/23 0727       Bertin Saenz MD  10/16/23 0727

## 2024-03-10 ENCOUNTER — HEALTH MAINTENANCE LETTER (OUTPATIENT)
Age: 24
End: 2024-03-10

## 2024-06-18 ENCOUNTER — HOSPITAL ENCOUNTER (EMERGENCY)
Facility: CLINIC | Age: 24
Discharge: HOME | End: 2024-06-18
Attending: SOCIAL WORKER | Admitting: SOCIAL WORKER
Payer: COMMERCIAL

## 2024-06-18 ENCOUNTER — APPOINTMENT (OUTPATIENT)
Dept: ULTRASOUND IMAGING | Facility: CLINIC | Age: 24
End: 2024-06-18
Attending: SOCIAL WORKER
Payer: COMMERCIAL

## 2024-06-18 ENCOUNTER — APPOINTMENT (OUTPATIENT)
Dept: MRI IMAGING | Facility: CLINIC | Age: 24
End: 2024-06-18
Attending: SOCIAL WORKER
Payer: COMMERCIAL

## 2024-06-18 VITALS
OXYGEN SATURATION: 98 % | DIASTOLIC BLOOD PRESSURE: 81 MMHG | WEIGHT: 197.53 LBS | TEMPERATURE: 96.2 F | HEIGHT: 64 IN | SYSTOLIC BLOOD PRESSURE: 122 MMHG | RESPIRATION RATE: 20 BRPM | HEART RATE: 76 BPM | BODY MASS INDEX: 33.72 KG/M2

## 2024-06-18 DIAGNOSIS — O21.9 NAUSEA AND VOMITING IN PREGNANCY: ICD-10-CM

## 2024-06-18 LAB
ALBUMIN SERPL BCG-MCNC: 4.5 G/DL (ref 3.5–5.2)
ALBUMIN UR-MCNC: 30 MG/DL
ALP SERPL-CCNC: 79 U/L (ref 40–150)
ALT SERPL W P-5'-P-CCNC: 6 U/L (ref 0–50)
ANION GAP SERPL CALCULATED.3IONS-SCNC: 15 MMOL/L (ref 7–15)
APPEARANCE UR: CLEAR
AST SERPL W P-5'-P-CCNC: 11 U/L (ref 0–45)
BASOPHILS # BLD AUTO: 0 10E3/UL (ref 0–0.2)
BASOPHILS NFR BLD AUTO: 0 %
BILIRUB SERPL-MCNC: 0.4 MG/DL
BILIRUB UR QL STRIP: NEGATIVE
BUN SERPL-MCNC: 8.9 MG/DL (ref 6–20)
CALCIUM SERPL-MCNC: 10 MG/DL (ref 8.6–10)
CHLORIDE SERPL-SCNC: 102 MMOL/L (ref 98–107)
COLOR UR AUTO: YELLOW
CREAT SERPL-MCNC: 0.69 MG/DL (ref 0.51–0.95)
DEPRECATED HCO3 PLAS-SCNC: 21 MMOL/L (ref 22–29)
EGFRCR SERPLBLD CKD-EPI 2021: >90 ML/MIN/1.73M2
EOSINOPHIL # BLD AUTO: 0.1 10E3/UL (ref 0–0.7)
EOSINOPHIL NFR BLD AUTO: 1 %
ERYTHROCYTE [DISTWIDTH] IN BLOOD BY AUTOMATED COUNT: 12.2 % (ref 10–15)
GLUCOSE SERPL-MCNC: 102 MG/DL (ref 70–99)
GLUCOSE UR STRIP-MCNC: NEGATIVE MG/DL
HCG INTACT+B SERPL-ACNC: ABNORMAL MIU/ML
HCT VFR BLD AUTO: 46 % (ref 35–47)
HGB BLD-MCNC: 15.7 G/DL (ref 11.7–15.7)
HGB UR QL STRIP: NEGATIVE
HOLD SPECIMEN: NORMAL
IMM GRANULOCYTES # BLD: 0 10E3/UL
IMM GRANULOCYTES NFR BLD: 0 %
KETONES UR STRIP-MCNC: >150 MG/DL
LEUKOCYTE ESTERASE UR QL STRIP: NEGATIVE
LIPASE SERPL-CCNC: 19 U/L (ref 13–60)
LYMPHOCYTES # BLD AUTO: 1.3 10E3/UL (ref 0.8–5.3)
LYMPHOCYTES NFR BLD AUTO: 14 %
MCH RBC QN AUTO: 29.1 PG (ref 26.5–33)
MCHC RBC AUTO-ENTMCNC: 34.1 G/DL (ref 31.5–36.5)
MCV RBC AUTO: 85 FL (ref 78–100)
MONOCYTES # BLD AUTO: 0.6 10E3/UL (ref 0–1.3)
MONOCYTES NFR BLD AUTO: 6 %
MUCOUS THREADS #/AREA URNS LPF: PRESENT /LPF
NEUTROPHILS # BLD AUTO: 7.7 10E3/UL (ref 1.6–8.3)
NEUTROPHILS NFR BLD AUTO: 79 %
NITRATE UR QL: NEGATIVE
NRBC # BLD AUTO: 0 10E3/UL
NRBC BLD AUTO-RTO: 0 /100
PH UR STRIP: 6 [PH] (ref 5–7)
PLATELET # BLD AUTO: 249 10E3/UL (ref 150–450)
POTASSIUM SERPL-SCNC: 3.9 MMOL/L (ref 3.4–5.3)
PROT SERPL-MCNC: 8.1 G/DL (ref 6.4–8.3)
RBC # BLD AUTO: 5.4 10E6/UL (ref 3.8–5.2)
RBC URINE: 2 /HPF
SODIUM SERPL-SCNC: 138 MMOL/L (ref 135–145)
SP GR UR STRIP: 1.03 (ref 1–1.03)
SQUAMOUS EPITHELIAL: <1 /HPF
UROBILINOGEN UR STRIP-MCNC: 2 MG/DL
WBC # BLD AUTO: 9.8 10E3/UL (ref 4–11)
WBC URINE: 6 /HPF

## 2024-06-18 PROCEDURE — 93976 VASCULAR STUDY: CPT

## 2024-06-18 PROCEDURE — 84702 CHORIONIC GONADOTROPIN TEST: CPT | Performed by: SOCIAL WORKER

## 2024-06-18 PROCEDURE — 83690 ASSAY OF LIPASE: CPT | Performed by: SOCIAL WORKER

## 2024-06-18 PROCEDURE — 250N000011 HC RX IP 250 OP 636: Mod: JZ | Performed by: SOCIAL WORKER

## 2024-06-18 PROCEDURE — 258N000003 HC RX IP 258 OP 636: Mod: JZ | Performed by: SOCIAL WORKER

## 2024-06-18 PROCEDURE — 96375 TX/PRO/DX INJ NEW DRUG ADDON: CPT

## 2024-06-18 PROCEDURE — 85025 COMPLETE CBC W/AUTO DIFF WBC: CPT | Performed by: SOCIAL WORKER

## 2024-06-18 PROCEDURE — 74181 MRI ABDOMEN W/O CONTRAST: CPT

## 2024-06-18 PROCEDURE — 99285 EMERGENCY DEPT VISIT HI MDM: CPT | Mod: 25

## 2024-06-18 PROCEDURE — 76801 OB US < 14 WKS SINGLE FETUS: CPT

## 2024-06-18 PROCEDURE — 81001 URINALYSIS AUTO W/SCOPE: CPT | Performed by: SOCIAL WORKER

## 2024-06-18 PROCEDURE — 96361 HYDRATE IV INFUSION ADD-ON: CPT

## 2024-06-18 PROCEDURE — 36415 COLL VENOUS BLD VENIPUNCTURE: CPT | Performed by: SOCIAL WORKER

## 2024-06-18 PROCEDURE — 96374 THER/PROPH/DIAG INJ IV PUSH: CPT

## 2024-06-18 PROCEDURE — C9113 INJ PANTOPRAZOLE SODIUM, VIA: HCPCS | Mod: JZ | Performed by: SOCIAL WORKER

## 2024-06-18 PROCEDURE — 84155 ASSAY OF PROTEIN SERUM: CPT | Performed by: SOCIAL WORKER

## 2024-06-18 PROCEDURE — 84450 TRANSFERASE (AST) (SGOT): CPT | Performed by: SOCIAL WORKER

## 2024-06-18 RX ORDER — ACETAMINOPHEN 500 MG
1000 TABLET ORAL ONCE
Status: DISCONTINUED | OUTPATIENT
Start: 2024-06-18 | End: 2024-06-18 | Stop reason: HOSPADM

## 2024-06-18 RX ORDER — FAMOTIDINE 20 MG/1
20 TABLET, FILM COATED ORAL 2 TIMES DAILY
Qty: 14 TABLET | Refills: 0 | Status: SHIPPED | OUTPATIENT
Start: 2024-06-18 | End: 2024-06-25

## 2024-06-18 RX ORDER — PANTOPRAZOLE SODIUM 40 MG/1
40 TABLET, DELAYED RELEASE ORAL DAILY
Qty: 14 TABLET | Refills: 0 | Status: SHIPPED | OUTPATIENT
Start: 2024-06-18 | End: 2024-07-02

## 2024-06-18 RX ORDER — ONDANSETRON 4 MG/1
4 TABLET, FILM COATED ORAL EVERY 8 HOURS PRN
Qty: 21 TABLET | Refills: 0 | Status: SHIPPED | OUTPATIENT
Start: 2024-06-18 | End: 2024-06-25

## 2024-06-18 RX ORDER — ONDANSETRON 2 MG/ML
4 INJECTION INTRAMUSCULAR; INTRAVENOUS ONCE
Status: COMPLETED | OUTPATIENT
Start: 2024-06-18 | End: 2024-06-18

## 2024-06-18 RX ADMIN — SODIUM CHLORIDE 1000 ML: 9 INJECTION, SOLUTION INTRAVENOUS at 07:51

## 2024-06-18 RX ADMIN — ONDANSETRON 4 MG: 2 INJECTION INTRAMUSCULAR; INTRAVENOUS at 07:51

## 2024-06-18 RX ADMIN — PANTOPRAZOLE SODIUM 40 MG: 40 INJECTION, POWDER, FOR SOLUTION INTRAVENOUS at 07:51

## 2024-06-18 ASSESSMENT — ACTIVITIES OF DAILY LIVING (ADL)
ADLS_ACUITY_SCORE: 35

## 2024-06-18 ASSESSMENT — COLUMBIA-SUICIDE SEVERITY RATING SCALE - C-SSRS
2. HAVE YOU ACTUALLY HAD ANY THOUGHTS OF KILLING YOURSELF IN THE PAST MONTH?: NO
6. HAVE YOU EVER DONE ANYTHING, STARTED TO DO ANYTHING, OR PREPARED TO DO ANYTHING TO END YOUR LIFE?: NO
1. IN THE PAST MONTH, HAVE YOU WISHED YOU WERE DEAD OR WISHED YOU COULD GO TO SLEEP AND NOT WAKE UP?: NO

## 2024-06-18 NOTE — ED TRIAGE NOTES
Patient reports morning sickness accompanied with right sided abdominal pain. Patient is approximately 2-3 weeks pregnant. Also reports some blood in vomit. ABCs intact

## 2024-06-18 NOTE — DISCHARGE INSTRUCTIONS
You are seen emergency department for vomiting in pregnancy as well as some abdominal pain.  Exam and workup was overall reassuring here, we do not see signs of acute emergency.  You felt better and were able to keep water down.  I am giving a prescription for antacids as well as for Zofran, please call the OB/GYN doctors to establish care.    If you start to have severely worsening pain, if you have fever, if he cannot keep anything down again, or other concerning symptoms please come back to the emergency room.

## 2024-06-18 NOTE — ED PROVIDER NOTES
"  Emergency Department Note      History of Present Illness     Chief Complaint  Morning Sickness    HPI  Hilda MIGEL Escamilla is a 24 year old currently 5 weeks with LMP 5/6/24,  pregnant who presents with vomiting and RLQ pain. Reports vomiting over the past week with 7-8 episodes today. A few streaks of red blood in emesis prompted her to come to ED. No dark tarry or bloody stools. Pt reports RLQ that developed today, sharp. Reports increased urinary frequency. Had an endoscopy last year which was negative for acute findings. No fever, vaginal bleeding.     Independent Historian  None    Review of External Notes  Reviewed ED visit from 10/15/2023: Seen for hematemesis, improved and sent home with Reglan   Past Medical History   Relevant Medical History and Problem List  No other significant past medical history or family history.    Medications  The patient is currently on no regular medications.    Physical Exam   Patient Vitals for the past 24 hrs:   BP Temp Pulse Resp SpO2 Height Weight   06/18/24 0734 (!) 136/102 (!) 96.2  F (35.7  C) 105 20 100 % 1.626 m (5' 4\") 89.6 kg (197 lb 8.5 oz)     Physical Exam  General: Overall stable and nontoxic appearing  HEENT: Conjunctivae clear, no scleral icterus, mucous membranes moist  Neuro: Alert, moving all extremities equally with intention  CV: Regular rate and rhythm, radial and DP pulses equal  Respiratory: No signs of respiratory distress, lungs clear to auscultation bilaterally   Abdomen: Soft, without rigidity or rebound throughout   Focal RLQ tenderness    No CVA tenderness bilaterally  MSK: No lower extremity swelling or tenderness     Diagnostics   Lab Results   Labs Ordered and Resulted from Time of ED Arrival to Time of ED Departure   CBC WITH PLATELETS AND DIFFERENTIAL - Abnormal       Result Value    WBC Count 9.8      RBC Count 5.40 (*)     Hemoglobin 15.7      Hematocrit 46.0      MCV 85      MCH 29.1      MCHC 34.1      RDW 12.2      Platelet Count " 249      % Neutrophils 79      % Lymphocytes 14      % Monocytes 6      % Eosinophils 1      % Basophils 0      % Immature Granulocytes 0      NRBCs per 100 WBC 0      Absolute Neutrophils 7.7      Absolute Lymphocytes 1.3      Absolute Monocytes 0.6      Absolute Eosinophils 0.1      Absolute Basophils 0.0      Absolute Immature Granulocytes 0.0      Absolute NRBCs 0.0     COMPREHENSIVE METABOLIC PANEL   LIPASE   ROUTINE UA WITH MICROSCOPIC REFLEX TO CULTURE   HCG QUANTITATIVE PREGNANCY       Imaging  No orders to display       EKG   Please see ED course below    Independent Interpretation  None  ED Course    Medications Administered  Medications   sodium chloride 0.9% BOLUS 1,000 mL (1,000 mLs Intravenous $New Bag 6/18/24 0751)   ondansetron (ZOFRAN) injection 4 mg (4 mg Intravenous $Given 6/18/24 0751)   pantoprazole (PROTONIX) IV push injection 40 mg (40 mg Intravenous $Given 6/18/24 0751)       Procedures  Procedures     Discussion of Management  None    Social Determinants of Health adding to complexity of care  None    ED Course  ED Course as of 06/19/24 2118 Tue Jun 18, 2024   0740 I obtained history and examined the patient as noted above     1123 I rechecked the patient and explained findings.       Medical Decision Making / Diagnosis   CMS Diagnoses: None    MIPS     None    [facility]  Hilda LAINEZ Dylan Escamilla is a 24 year old female currently 5 weeks pregnant who presented with 1 week of nausea vomiting with some small streaks of blood and right lower quadrant pain.  Overall stable well-appearing.  No signs of systemic infection.  Signs of appendicitis.  Ultrasound showed a single intrauterine pregnancy with hCG quant above discriminatory threshold, good blood flow to the ovaries without any adnexal abnormalities.  Her pain was overall well-controlled.  Feel that torsion is less likely.  UA without signs of infection.  Given fluids and antiemetics, felt improved and able to tolerate PO.  Suspect Isidra-Manrique  tear vs gastritis etiology for her streaks of blood, I doubt hemodynamically significant upper GI bleed with overall well appearance, no melena. Patient given prescriptions for Zofran as well as metoprolol and encouraged to follow-up with OB, states she does not have a primary care provider so sent a referral for this.  Return precautions were discussed.  Patient was comfortable with the plan for discharge.  She was discharged in stable condition.    Disposition  The patient was discharged.     ICD-10 Codes:    ICD-10-CM    1. Nausea and vomiting in pregnancy  O21.9            Discharge Medications  New Prescriptions    No medications on file         Scribe Disclosure:  Saloni CASTRO, am serving as a scribe at 8:18 AM on 6/18/2024 to document services personally performed by Magda Bain MD based on my observations and the provider's statements to me.         Magda Bain MD  06/19/24 8546

## 2024-07-15 ENCOUNTER — HOME INFUSION (PRE-WILLOW HOME INFUSION) (OUTPATIENT)
Dept: PHARMACY | Facility: CLINIC | Age: 24
End: 2024-07-15
Payer: COMMERCIAL

## 2024-07-15 NOTE — PROGRESS NOTES
Therapy: IV Lactated Ringers, Metoclopramide  Insurance: [facility]    This patient has coverage for IV Lactated Ringers, Metoclopramide through their [facility] plan, patient has a deductible of $3300.00 met $195.08, once the deductible is met patient is covered at 70%. The patient has an out of pocket of $6000.00 met $195.08, once the out of pocket is met patient is covered at 100%.     In reference to fax referral from Juliet Sheikh.    Please contact Intake with any questions, [phone] or In Basket pool,  Home Infusion ([phone]).

## 2024-07-22 ENCOUNTER — MEDICAL CORRESPONDENCE (OUTPATIENT)
Dept: HEALTH INFORMATION MANAGEMENT | Facility: CLINIC | Age: 24
End: 2024-07-22
Payer: COMMERCIAL

## 2024-07-22 ENCOUNTER — TRANSFERRED RECORDS (OUTPATIENT)
Dept: HEALTH INFORMATION MANAGEMENT | Facility: CLINIC | Age: 24
End: 2024-07-22
Payer: COMMERCIAL

## 2024-07-23 LAB
HEPATITIS B SURFACE ANTIGEN (EXTERNAL): NONREACTIVE
HIV1+2 AB SERPL QL IA: NONREACTIVE
RUBELLA ANTIBODY IGG (EXTERNAL): NORMAL
TREPONEMA PALLIDUM ANTIBODY (EXTERNAL): NONREACTIVE

## 2024-07-24 ENCOUNTER — TRANSCRIBE ORDERS (OUTPATIENT)
Dept: MATERNAL FETAL MEDICINE | Facility: CLINIC | Age: 24
End: 2024-07-24
Payer: COMMERCIAL

## 2024-07-24 DIAGNOSIS — O26.90 PREGNANCY RELATED CONDITION, ANTEPARTUM: Primary | ICD-10-CM

## 2024-09-11 ENCOUNTER — PRE VISIT (OUTPATIENT)
Dept: MATERNAL FETAL MEDICINE | Facility: CLINIC | Age: 24
End: 2024-09-11
Payer: COMMERCIAL

## 2024-09-20 ENCOUNTER — OFFICE VISIT (OUTPATIENT)
Dept: MATERNAL FETAL MEDICINE | Facility: CLINIC | Age: 24
End: 2024-09-20
Attending: OBSTETRICS & GYNECOLOGY
Payer: COMMERCIAL

## 2024-09-20 ENCOUNTER — HOSPITAL ENCOUNTER (OUTPATIENT)
Dept: ULTRASOUND IMAGING | Facility: CLINIC | Age: 24
Discharge: HOME | End: 2024-09-20
Attending: OBSTETRICS & GYNECOLOGY
Payer: COMMERCIAL

## 2024-09-20 DIAGNOSIS — O26.90 PREGNANCY RELATED CONDITION, ANTEPARTUM: ICD-10-CM

## 2024-09-20 DIAGNOSIS — Z36.9 PRENATAL SCREENING ENCOUNTER: Primary | ICD-10-CM

## 2024-09-20 DIAGNOSIS — O10.919 CHRONIC HYPERTENSION AFFECTING PREGNANCY: ICD-10-CM

## 2024-09-20 DIAGNOSIS — Z87.74 HISTORY OF CONGENITAL HEART DEFECT: Primary | ICD-10-CM

## 2024-09-20 DIAGNOSIS — Z87.74 HISTORY OF CONGENITAL HEART DEFECT: ICD-10-CM

## 2024-09-20 DIAGNOSIS — Z36.89 ENCOUNTER FOR FETAL ANATOMIC SURVEY: ICD-10-CM

## 2024-09-20 PROCEDURE — 76811 OB US DETAILED SNGL FETUS: CPT

## 2024-09-20 PROCEDURE — 96040 HC GENETIC COUNSELING, EACH 30 MINUTES: CPT

## 2024-09-20 PROCEDURE — 99203 OFFICE O/P NEW LOW 30 MIN: CPT | Mod: 25 | Performed by: STUDENT IN AN ORGANIZED HEALTH CARE EDUCATION/TRAINING PROGRAM

## 2024-09-20 PROCEDURE — 76811 OB US DETAILED SNGL FETUS: CPT | Mod: 26 | Performed by: STUDENT IN AN ORGANIZED HEALTH CARE EDUCATION/TRAINING PROGRAM

## 2024-09-20 NOTE — PROGRESS NOTES
[facility]  Genetic Counseling Consult    Patient:  Hilda Escamilla YOB: 2000   Date of Service:  24   MRN: [id number]    Hilda was seen at the [facility] for genetic consultation. The indication for genetic counseling is personal medical history of a congenital heart defect. The patient was accompanied to this visit by her partner, Sheldon.    The session was conducted in English.        IMPRESSION/ PLAN   1. Hilda has not had genetic screening in this pregnancy and declines options discussed today.    2. During today's MF visit, Hilda had a genetic counseling session only. Screening and diagnostic testing was discussed and declined.     3. Hilda had a level II comprehensive anatomy ultrasound today. Please see the ultrasound report for further details.    4. Further recommendations include a fetal echocardiogram with [facility]. The upcoming ultrasound has been scheduled for 10/14/2024.    PREGNANCY HISTORY   /Parity:     Hilda shared she had COVID-19 in July and had a fever of around 101. She sought care at an urgent care and her primary OB. No heavy bleeding, severe cramping, or other exposures of concern were reported at today's visit. This is Hilda's first pregnancy.    CURRENT PREGNANCY   Current Age: 24 year old   Age at Delivery: 24 year old  JOANNE: 2025, by Ultrasound                                   Gestational Age: 20w0d  This pregnancy is a single gestation.   This pregnancy was conceived spontaneously.    MEDICAL HISTORY   Hilda s reported medical history is not expected to impact pregnancy management or risks to fetal development. Of note, Hilda does have a history of a heart murmur. She subsequently had an echocardiogram when she was around 9 years old which identified a hole in her heart. She did not require surgical intervention and has not had an echocardiogram  "since.     Congenital heart defects are common, occurring in 0.5 to 1.0% live births. Congenital heart defects can be caused by genetic factors, chromosomal abnormalities, or environmental exposures. Additionally, congenital heart defects can be isolated or part of a broader genetic syndrome. Most isolated congential heart defects are likely caused by the combination of several genetic and environmental factors and have a multifactorial inheritance pattern. In general, the recurrence risk is likely increased for first and second degree relatives of an individual with a congenital heart defect. This pregnancy is a first degree relative to Hilda; therefore, the recurrence risk is likely slightly higher than the population risk. Fetal echocardiogram would be recommended in light of this family history.         FAMILY HISTORY   A three-generation family history was obtained today and is scanned under the \"Media\" tab in Epic. The family history was reported by Tab.    The following significant findings were reported today:   Hilda was found to have a small hole in her heart on an echocardiogram when she was around 9 years old. Please see the medical history section above for more details.  Hilda's maternal grandmother experienced a stillbirth of twins.  We discussed that stillbirths can be caused by a variety of factors including genetic factors. Without additional information about this stillbirth it is impossible to give a more thorough risk assessment. A familial chromosome rearrangement or genetic syndrome cannot be ruled out.   Hilda's maternal uncle passed in his 40s due to metastatic lymph node cancer. Hilda's paternal aunt passed in her 50s-60s due to lung cancer. Both of these relatives had histories of tobacco use. One of Sheldon's paternal uncles has a history of blood cancer diagnosed around 50. The family believes this is linked to exposures he experienced while serving.  Cancer " most often occurs by chance, however some families seem to develop cancer more frequently than expected. We discussed that hereditary cancer predisposition syndromes often cause cancer diagnoses in multiple relatives in multiple generations at earlier ages. Although this does not appear to be the case in either Hilda or Bryans families, genetic counseling is available for cancer syndromes. If the family wants more information they can contact the [facility] Cancer Risk Management Program ([phone]).    Both Hilda and her maternal grandmother have histories of autoimmune concerns. Hilda has not been given any official diagnoses.  Autoimmune disorders are multifactorial in nature, resulting from both genetic and non-genetic factors. Once an autoimmune disease is present in a family, other relatives may be at increased risk to develop the same disease or a different autoimmune disease.  Presymptomatic and prenatal testing are not available for autoimmune disorders.   Sheldon's mother and one maternal aunt have keratoconus. Sheldon has been evaluated by an ophthalmologist and he currently does not have keratoconus.   Keratoconus can be a feature of a genetic syndrome, however if it is isolated it is most commonly not due to a genetic etiology. Isolated keratoconus can also run in families in autosomal dominant or recessive patterns. We discussed that there could be an increased chance for the couple to have a child with keratoconus. Making their pediatrician aware of this family history could be beneficial.     Otherwise, the reported family history is unremarkable for multiple miscarriages, stillbirths, birth defects, intellectual disabilities, autism spectrum disorder, developmental delays, cancer diagnosed under 50, known genetic conditions, and consanguinity.       RISK ASSESSMENT FOR INHERITED CONDITIONS AND CARRIER SCREENING OPTIONS   Expanded carrier screening is available to screen for  autosomal recessive conditions and X-linked conditions in a large list of genes. Carrier screening does not test the pregnancy but gives a risk assessment for the pregnancy and future pregnancies to have the condition. Expanded carrier screening is designed to identify carrier status for conditions that are primarily childhood or adolescent onset. Expanded carrier screening does not evaluate for adult-onset conditions such as hereditary cancer syndromes, dementia/Alzheimer's disease, or cardiovascular disease risk factors. Additionally, expanded carrier screening is not comprehensive for all known genetic diseases or inherited conditions. Carrier screening does not test for all genetic and health conditions or risk factors.     Autosomal recessive conditions happen when a mutation has been inherited from the egg and sperm and include conditions like cystic fibrosis, thalassemia, hearing loss, spinal muscular atrophy, and more. We reviewed that when both biological parents carry a harmful genetic change in a gene associated with autosomal recessive inheritance, each of their pregnancies has a 1 in 4 (25%) chance to be affected by that condition. X-linked conditions happen when a mutation has been inherited from the egg and include conditions like fragile X syndrome.With X-linked conditions, the specific risk generally depends on the chromosomal sex of the fetus, with XY individuals (generally male) being most severely affected.      screening was reviewed. About [place] Screening    The patient does NOT have a family history of known inherited conditions. This does NOT mean the patient and/or their partner is not a carrier of a condition. Approximately 90% of couples at an increased reproductive risk for an inherited condition have no family history of that condition.     The patient has not had carrier screening previously. The couple declined the carrier screening options and declined a thorough  discussion of the options. They are aware the option will remain, and they can contact us if they would like to pursue screening.    RISK ASSESSMENT FOR CHROMOSOME CONDITIONS   We explained that the risk for fetal chromosome abnormalities increases with maternal age. We discussed specific features of common chromosome abnormalities, including Down syndrome, trisomy 13, trisomy 18, and sex chromosome trisomies.    At age 24 at midtrimester, the risk to have a baby with Down syndrome is 1 in 1040.  At age 24 at midtrimester, the risk to have a baby with any chromosome abnormality is 1 in 520.     Hilda has not had genetic screening in this pregnancy and declines options discussed today. The couple shared that they would be open to screening if there are any concerns identified on ultrasound.    GENETIC TESTING OPTIONS FOR CHROMOSOMAL CONDITIONS   Genetic testing during a pregnancy includes screening and diagnostic procedures.      Screening tests are non-invasive which means no risk to the pregnancy and includes ultrasounds and blood work. The benefits and limitations of screening were reviewed. Screening tests provide a risk assessment (chance) specific to the pregnancy for certain fetal chromosome abnormalities but cannot definitively diagnose or exclude a fetal chromosome abnormality. Follow-up genetic counseling and consideration of diagnostic testing is recommended with any abnormal screening result. Diagnostic testing during a pregnancy is more certain and can test for more conditions. However, the tests do have a risk of miscarriage that requires careful consideration. These tests can detect fetal chromosome abnormalities with greater than 99% certainty. Results can be compromised by maternal cell contamination or mosaicism and are limited by the resolution of current genetic testing technology.     There is no screening or diagnostic test that detects all forms of birth defects or intellectual disability.      We discussed the following screening options:   Non-invasive prenatal testing (NIPT)  Also called cell-free DNA screening because it detects chromosomes from the placenta in the pregnant person's blood  Can be done any time after 10 weeks gestation  Screens for trisomy 21, trisomy 18, trisomy 13, and sex chromosome aneuploidies  Cannot screen for open neural tube defects, maternal serum AFP after 15 weeks is recommended    We discussed the following ultrasound options:  Comprehensive level II ultrasound (Fetal Anatomy Ultrasound)  Ultrasound done between 18-20 weeks gestation  Screens for major birth defects and markers for aneuploidy (like trisomy 21 and trisomy 18)  Includes looking at the fetus/baby's growth, heart, organs (stomach, kidneys), placenta, and amniotic fluid  Fetal Echocardiogram  Ultrasound done between 22-24 weeks gestation  Screen for heart defects  Recommended if there are concerns about the heart or other indications like IVF pregnancy or maternal diabetes    We discussed the following diagnostic options:   Amniocentesis  Invasive diagnostic procedure done after 15 weeks gestation  The procedure collects a small sample of amniotic fluid for the purpose of chromosomal testing and/or other genetic testing  Diagnostic result; more than 99% sensitivity for fetal chromosome abnormalities  Testing for AFP in the amniotic fluid can test for open neural tube defects           It was a pleasure to be involved with [facility]. Face-to-face time of the meeting was 30 minutes.    Snow Logan MS, [facility]  Maternal Fetal Medicine  Office: [phone]  [facility]: [phone]   Fax: [phone]  [facility]

## 2024-09-20 NOTE — NURSING NOTE
Patient presents to [facility] for GC/L2 at 20w0d due to hx maternal CHD. Positive fetal movement. Denies LOF, vaginal bleeding or cramping/contractions. SBAR given to BOZENA HINKLE, see their note in Epic.

## 2024-09-20 NOTE — PROGRESS NOTES
The patient was seen for an ultrasound in the [facility] today.  For a detailed report of the ultrasound examination, please see the ultrasound report which can be found under the imaging tab.     If you have questions regarding today's evaluation or if we can be of further service, please contact the Maternal-Fetal Medicine Center.    Genna Clifford MD  Maternal Fetal Medicine

## 2025-01-09 ENCOUNTER — HOSPITAL ENCOUNTER (OUTPATIENT)
Facility: CLINIC | Age: 25
Discharge: HOME | End: 2025-01-09
Attending: ADVANCED PRACTICE MIDWIFE | Admitting: ADVANCED PRACTICE MIDWIFE
Payer: COMMERCIAL

## 2025-01-09 VITALS
HEART RATE: 88 BPM | SYSTOLIC BLOOD PRESSURE: 132 MMHG | WEIGHT: 247 LBS | RESPIRATION RATE: 16 BRPM | TEMPERATURE: 98.8 F | BODY MASS INDEX: 42.17 KG/M2 | DIASTOLIC BLOOD PRESSURE: 81 MMHG | HEIGHT: 64 IN

## 2025-01-09 PROBLEM — Z36.89 ENCOUNTER FOR TRIAGE IN PREGNANT PATIENT: Status: ACTIVE | Noted: 2025-01-09

## 2025-01-09 LAB
ALBUMIN MFR UR ELPH: 7.6 MG/DL
ALBUMIN SERPL BCG-MCNC: 3.5 G/DL (ref 3.5–5.2)
ALP SERPL-CCNC: 147 U/L (ref 40–150)
ALT SERPL W P-5'-P-CCNC: 10 U/L (ref 0–50)
ANION GAP SERPL CALCULATED.3IONS-SCNC: 11 MMOL/L (ref 7–15)
AST SERPL W P-5'-P-CCNC: 16 U/L (ref 0–45)
BASOPHILS # BLD AUTO: 0 10E3/UL (ref 0–0.2)
BASOPHILS NFR BLD AUTO: 0 %
BILIRUB SERPL-MCNC: <0.2 MG/DL
BUN SERPL-MCNC: 7.6 MG/DL (ref 6–20)
CALCIUM SERPL-MCNC: 9.1 MG/DL (ref 8.8–10.4)
CHLORIDE SERPL-SCNC: 106 MMOL/L (ref 98–107)
CREAT SERPL-MCNC: 0.72 MG/DL (ref 0.51–0.95)
CREAT UR-MCNC: 67.8 MG/DL
EGFRCR SERPLBLD CKD-EPI 2021: >90 ML/MIN/1.73M2
EOSINOPHIL # BLD AUTO: 0.1 10E3/UL (ref 0–0.7)
EOSINOPHIL NFR BLD AUTO: 1 %
ERYTHROCYTE [DISTWIDTH] IN BLOOD BY AUTOMATED COUNT: 13.2 % (ref 10–15)
GLUCOSE SERPL-MCNC: 85 MG/DL (ref 70–99)
HCO3 SERPL-SCNC: 21 MMOL/L (ref 22–29)
HCT VFR BLD AUTO: 34.9 % (ref 35–47)
HGB BLD-MCNC: 11.4 G/DL (ref 11.7–15.7)
HOLD SPECIMEN: NORMAL
HOLD SPECIMEN: NORMAL
IMM GRANULOCYTES # BLD: 0.1 10E3/UL
IMM GRANULOCYTES NFR BLD: 1 %
LYMPHOCYTES # BLD AUTO: 1.7 10E3/UL (ref 0.8–5.3)
LYMPHOCYTES NFR BLD AUTO: 14 %
MCH RBC QN AUTO: 27.6 PG (ref 26.5–33)
MCHC RBC AUTO-ENTMCNC: 32.7 G/DL (ref 31.5–36.5)
MCV RBC AUTO: 85 FL (ref 78–100)
MONOCYTES # BLD AUTO: 0.8 10E3/UL (ref 0–1.3)
MONOCYTES NFR BLD AUTO: 6 %
NEUTROPHILS # BLD AUTO: 9.7 10E3/UL (ref 1.6–8.3)
NEUTROPHILS NFR BLD AUTO: 78 %
NRBC # BLD AUTO: 0 10E3/UL
NRBC BLD AUTO-RTO: 0 /100
PLATELET # BLD AUTO: 214 10E3/UL (ref 150–450)
POTASSIUM SERPL-SCNC: 4 MMOL/L (ref 3.4–5.3)
PROT SERPL-MCNC: 6.5 G/DL (ref 6.4–8.3)
PROT/CREAT 24H UR: 0.11 MG/MG CR (ref 0–0.2)
RBC # BLD AUTO: 4.13 10E6/UL (ref 3.8–5.2)
SODIUM SERPL-SCNC: 138 MMOL/L (ref 135–145)
WBC # BLD AUTO: 12.3 10E3/UL (ref 4–11)

## 2025-01-09 PROCEDURE — 82947 ASSAY GLUCOSE BLOOD QUANT: CPT | Performed by: ADVANCED PRACTICE MIDWIFE

## 2025-01-09 PROCEDURE — 36415 COLL VENOUS BLD VENIPUNCTURE: CPT | Performed by: ADVANCED PRACTICE MIDWIFE

## 2025-01-09 PROCEDURE — 82310 ASSAY OF CALCIUM: CPT | Performed by: ADVANCED PRACTICE MIDWIFE

## 2025-01-09 PROCEDURE — 84156 ASSAY OF PROTEIN URINE: CPT | Performed by: ADVANCED PRACTICE MIDWIFE

## 2025-01-09 PROCEDURE — G0463 HOSPITAL OUTPT CLINIC VISIT: HCPCS

## 2025-01-09 PROCEDURE — 85025 COMPLETE CBC W/AUTO DIFF WBC: CPT | Performed by: ADVANCED PRACTICE MIDWIFE

## 2025-01-09 RX ORDER — ONDANSETRON 4 MG/1
4 TABLET, ORALLY DISINTEGRATING ORAL EVERY 8 HOURS PRN
COMMUNITY

## 2025-01-09 RX ORDER — LIDOCAINE 40 MG/G
CREAM TOPICAL
Status: DISCONTINUED | OUTPATIENT
Start: 2025-01-09 | End: 2025-01-09 | Stop reason: HOSPADM

## 2025-01-09 RX ORDER — ACETAMINOPHEN 325 MG/1
325-650 TABLET ORAL EVERY 6 HOURS PRN
COMMUNITY

## 2025-01-09 RX ORDER — PANTOPRAZOLE SODIUM 40 MG/1
40 TABLET, DELAYED RELEASE ORAL DAILY
COMMUNITY

## 2025-01-09 RX ORDER — VITAMIN A ACETATE, .BETA.-CAROTENE, ASCORBIC ACID, CHOLECALCIFEROL, .ALPHA.-TOCOPHEROL ACETATE, DL-, THIAMINE MONONITRATE, RIBOFLAVIN, NIACINAMIDE, PYRIDOXINE HYDROCHLORIDE, FOLIC ACID, CYANOCOBALAMIN, CALCIUM CARBONATE, FERROUS FUMARATE, ZINC OXIDE, AND CUPRIC OXIDE 2000; 2000; 120; 400; 22; 1.84; 3; 20; 10; 1; 12; 200; 27; 25; 2 [IU]/1; [IU]/1; MG/1; [IU]/1; MG/1; MG/1; MG/1; MG/1; MG/1; MG/1; UG/1; MG/1; MG/1; MG/1; MG/1
1 TABLET ORAL DAILY
COMMUNITY

## 2025-01-09 ASSESSMENT — ACTIVITIES OF DAILY LIVING (ADL)
ADLS_ACUITY_SCORE: 15
ADLS_ACUITY_SCORE: 15

## 2025-01-10 NOTE — PROGRESS NOTES
, EDC 24 presents to [facility] triage accompanied by partner with complaints of increased swelling in feet. Patient reports taking her BP at home with a reading of 158/102. Patient reports headache pain of 3/10 and reports head and nasal congestion x 1 week. Consent obtained. EFM applied, category 1 tracing obtained with accels , no decels. No contractions on monitor, patient reports no contraction pain. BP's 120's-140's/80's. CBC, CMP and urine protein/creatinine ratio collected and all resulted WNL. Ankit Farah CNM notified of above findings. Will discharge patient to home at this time. Patient to follow up 1 week in clinic, sooner if symptoms worsen.

## 2025-01-19 LAB — GROUP B STREPTOCOCCUS (EXTERNAL): NEGATIVE

## 2025-01-20 ENCOUNTER — HOSPITAL ENCOUNTER (INPATIENT)
Facility: CLINIC | Age: 25
LOS: 3 days | Discharge: HOME | End: 2025-01-23
Attending: ADVANCED PRACTICE MIDWIFE | Admitting: ADVANCED PRACTICE MIDWIFE
Payer: COMMERCIAL

## 2025-01-20 PROBLEM — Z34.90 ENCOUNTER FOR INDUCTION OF LABOR: Status: ACTIVE | Noted: 2025-01-20

## 2025-01-20 LAB
ABO + RH BLD: NORMAL
ALBUMIN MFR UR ELPH: 12 MG/DL
ALBUMIN SERPL BCG-MCNC: 3.4 G/DL (ref 3.5–5.2)
ALP SERPL-CCNC: 160 U/L (ref 40–150)
ALT SERPL W P-5'-P-CCNC: 12 U/L (ref 0–50)
ANION GAP SERPL CALCULATED.3IONS-SCNC: 9 MMOL/L (ref 7–15)
AST SERPL W P-5'-P-CCNC: 20 U/L (ref 0–45)
BILIRUB SERPL-MCNC: <0.2 MG/DL
BLD GP AB SCN SERPL QL: NEGATIVE
BUN SERPL-MCNC: 9.2 MG/DL (ref 6–20)
CALCIUM SERPL-MCNC: 8.9 MG/DL (ref 8.8–10.4)
CHLORIDE SERPL-SCNC: 109 MMOL/L (ref 98–107)
CREAT SERPL-MCNC: 0.65 MG/DL (ref 0.51–0.95)
CREAT UR-MCNC: 65.2 MG/DL
EGFRCR SERPLBLD CKD-EPI 2021: >90 ML/MIN/1.73M2
ERYTHROCYTE [DISTWIDTH] IN BLOOD BY AUTOMATED COUNT: 13.2 % (ref 10–15)
GLUCOSE SERPL-MCNC: 96 MG/DL (ref 70–99)
HCO3 SERPL-SCNC: 21 MMOL/L (ref 22–29)
HCT VFR BLD AUTO: 34.8 % (ref 35–47)
HGB BLD-MCNC: 11.3 G/DL (ref 11.7–15.7)
MCH RBC QN AUTO: 27.1 PG (ref 26.5–33)
MCHC RBC AUTO-ENTMCNC: 32.5 G/DL (ref 31.5–36.5)
MCV RBC AUTO: 84 FL (ref 78–100)
PLATELET # BLD AUTO: 242 10E3/UL (ref 150–450)
POTASSIUM SERPL-SCNC: 4.1 MMOL/L (ref 3.4–5.3)
PROT SERPL-MCNC: 6.4 G/DL (ref 6.4–8.3)
PROT/CREAT 24H UR: 0.18 MG/MG CR (ref 0–0.2)
RBC # BLD AUTO: 4.17 10E6/UL (ref 3.8–5.2)
SODIUM SERPL-SCNC: 139 MMOL/L (ref 135–145)
SPECIMEN EXP DATE BLD: NORMAL
T PALLIDUM AB SER QL: NONREACTIVE
WBC # BLD AUTO: 10.7 10E3/UL (ref 4–11)

## 2025-01-20 PROCEDURE — 3E0P7VZ INTRODUCTION OF HORMONE INTO FEMALE REPRODUCTIVE, VIA NATURAL OR ARTIFICIAL OPENING: ICD-10-PCS | Performed by: ADVANCED PRACTICE MIDWIFE

## 2025-01-20 PROCEDURE — 250N000011 HC RX IP 250 OP 636: Performed by: ADVANCED PRACTICE MIDWIFE

## 2025-01-20 PROCEDURE — 36415 COLL VENOUS BLD VENIPUNCTURE: CPT | Performed by: ADVANCED PRACTICE MIDWIFE

## 2025-01-20 PROCEDURE — 250N000013 HC RX MED GY IP 250 OP 250 PS 637: Performed by: ADVANCED PRACTICE MIDWIFE

## 2025-01-20 PROCEDURE — 86780 TREPONEMA PALLIDUM: CPT | Performed by: ADVANCED PRACTICE MIDWIFE

## 2025-01-20 PROCEDURE — 120N000001 HC R&B MED SURG/OB

## 2025-01-20 PROCEDURE — 86900 BLOOD TYPING SEROLOGIC ABO: CPT | Performed by: ADVANCED PRACTICE MIDWIFE

## 2025-01-20 PROCEDURE — 84156 ASSAY OF PROTEIN URINE: CPT | Performed by: ADVANCED PRACTICE MIDWIFE

## 2025-01-20 PROCEDURE — 82040 ASSAY OF SERUM ALBUMIN: CPT | Performed by: ADVANCED PRACTICE MIDWIFE

## 2025-01-20 PROCEDURE — 85014 HEMATOCRIT: CPT | Performed by: ADVANCED PRACTICE MIDWIFE

## 2025-01-20 PROCEDURE — 86850 RBC ANTIBODY SCREEN: CPT | Performed by: ADVANCED PRACTICE MIDWIFE

## 2025-01-20 RX ORDER — MORPHINE SULFATE 10 MG/ML
10 INJECTION, SOLUTION INTRAMUSCULAR; INTRAVENOUS
Status: COMPLETED | OUTPATIENT
Start: 2025-01-20 | End: 2025-01-20

## 2025-01-20 RX ORDER — METOCLOPRAMIDE 10 MG/1
10 TABLET ORAL EVERY 6 HOURS PRN
Status: DISCONTINUED | OUTPATIENT
Start: 2025-01-20 | End: 2025-01-22 | Stop reason: HOSPADM

## 2025-01-20 RX ORDER — CITRIC ACID/SODIUM CITRATE 334-500MG
30 SOLUTION, ORAL ORAL
Status: DISCONTINUED | OUTPATIENT
Start: 2025-01-20 | End: 2025-01-22 | Stop reason: HOSPADM

## 2025-01-20 RX ORDER — ACETAMINOPHEN 325 MG/1
650 TABLET ORAL EVERY 4 HOURS PRN
Status: DISCONTINUED | OUTPATIENT
Start: 2025-01-20 | End: 2025-01-22 | Stop reason: HOSPADM

## 2025-01-20 RX ORDER — PROCHLORPERAZINE MALEATE 10 MG
10 TABLET ORAL EVERY 6 HOURS PRN
Status: DISCONTINUED | OUTPATIENT
Start: 2025-01-20 | End: 2025-01-22 | Stop reason: HOSPADM

## 2025-01-20 RX ORDER — ONDANSETRON 4 MG/1
4 TABLET, ORALLY DISINTEGRATING ORAL EVERY 6 HOURS PRN
Status: DISCONTINUED | OUTPATIENT
Start: 2025-01-20 | End: 2025-01-22 | Stop reason: HOSPADM

## 2025-01-20 RX ORDER — LOPERAMIDE HYDROCHLORIDE 2 MG/1
4 CAPSULE ORAL
Status: DISCONTINUED | OUTPATIENT
Start: 2025-01-20 | End: 2025-01-22 | Stop reason: HOSPADM

## 2025-01-20 RX ORDER — OXYTOCIN 10 [USP'U]/ML
10 INJECTION, SOLUTION INTRAMUSCULAR; INTRAVENOUS
Status: DISCONTINUED | OUTPATIENT
Start: 2025-01-20 | End: 2025-01-22 | Stop reason: HOSPADM

## 2025-01-20 RX ORDER — TRANEXAMIC ACID 10 MG/ML
1 INJECTION, SOLUTION INTRAVENOUS EVERY 30 MIN PRN
Status: DISCONTINUED | OUTPATIENT
Start: 2025-01-20 | End: 2025-01-22 | Stop reason: HOSPADM

## 2025-01-20 RX ORDER — CARBOPROST TROMETHAMINE 250 UG/ML
250 INJECTION, SOLUTION INTRAMUSCULAR
Status: DISCONTINUED | OUTPATIENT
Start: 2025-01-20 | End: 2025-01-22 | Stop reason: HOSPADM

## 2025-01-20 RX ORDER — LIDOCAINE 40 MG/G
CREAM TOPICAL
Status: DISCONTINUED | OUTPATIENT
Start: 2025-01-20 | End: 2025-01-23 | Stop reason: HOSPADM

## 2025-01-20 RX ORDER — KETOROLAC TROMETHAMINE 30 MG/ML
30 INJECTION, SOLUTION INTRAMUSCULAR; INTRAVENOUS
Status: COMPLETED | OUTPATIENT
Start: 2025-01-20 | End: 2025-01-22

## 2025-01-20 RX ORDER — NALOXONE HYDROCHLORIDE 0.4 MG/ML
0.2 INJECTION, SOLUTION INTRAMUSCULAR; INTRAVENOUS; SUBCUTANEOUS
Status: DISCONTINUED | OUTPATIENT
Start: 2025-01-20 | End: 2025-01-22 | Stop reason: HOSPADM

## 2025-01-20 RX ORDER — MISOPROSTOL 200 UG/1
800 TABLET ORAL
Status: DISCONTINUED | OUTPATIENT
Start: 2025-01-20 | End: 2025-01-22 | Stop reason: HOSPADM

## 2025-01-20 RX ORDER — HYDRALAZINE HYDROCHLORIDE 20 MG/ML
10 INJECTION INTRAMUSCULAR; INTRAVENOUS
Status: DISCONTINUED | OUTPATIENT
Start: 2025-01-20 | End: 2025-01-23 | Stop reason: HOSPADM

## 2025-01-20 RX ORDER — OXYTOCIN 10 [USP'U]/ML
10 INJECTION, SOLUTION INTRAMUSCULAR; INTRAVENOUS
Status: DISCONTINUED | OUTPATIENT
Start: 2025-01-20 | End: 2025-01-23 | Stop reason: HOSPADM

## 2025-01-20 RX ORDER — SODIUM CHLORIDE, SODIUM LACTATE, POTASSIUM CHLORIDE, CALCIUM CHLORIDE 600; 310; 30; 20 MG/100ML; MG/100ML; MG/100ML; MG/100ML
10-125 INJECTION, SOLUTION INTRAVENOUS CONTINUOUS
Status: DISCONTINUED | OUTPATIENT
Start: 2025-01-20 | End: 2025-01-23 | Stop reason: HOSPADM

## 2025-01-20 RX ORDER — MAGNESIUM SULFATE 4 G/50ML
4 INJECTION INTRAVENOUS
Status: DISCONTINUED | OUTPATIENT
Start: 2025-01-20 | End: 2025-01-23 | Stop reason: HOSPADM

## 2025-01-20 RX ORDER — TERBUTALINE SULFATE 1 MG/ML
0.25 INJECTION, SOLUTION SUBCUTANEOUS
Status: DISCONTINUED | OUTPATIENT
Start: 2025-01-20 | End: 2025-01-22 | Stop reason: HOSPADM

## 2025-01-20 RX ORDER — ONDANSETRON 2 MG/ML
4 INJECTION INTRAMUSCULAR; INTRAVENOUS EVERY 6 HOURS PRN
Status: DISCONTINUED | OUTPATIENT
Start: 2025-01-20 | End: 2025-01-22 | Stop reason: HOSPADM

## 2025-01-20 RX ORDER — FENTANYL CITRATE 50 UG/ML
100 INJECTION, SOLUTION INTRAMUSCULAR; INTRAVENOUS
Status: DISCONTINUED | OUTPATIENT
Start: 2025-01-20 | End: 2025-01-22 | Stop reason: HOSPADM

## 2025-01-20 RX ORDER — MISOPROSTOL 100 UG/1
25 TABLET ORAL EVERY 4 HOURS PRN
Status: DISCONTINUED | OUTPATIENT
Start: 2025-01-20 | End: 2025-01-22 | Stop reason: HOSPADM

## 2025-01-20 RX ORDER — IBUPROFEN 800 MG/1
800 TABLET, FILM COATED ORAL
Status: COMPLETED | OUTPATIENT
Start: 2025-01-20 | End: 2025-01-22

## 2025-01-20 RX ORDER — LABETALOL HYDROCHLORIDE 5 MG/ML
20-80 INJECTION, SOLUTION INTRAVENOUS EVERY 10 MIN PRN
Status: DISCONTINUED | OUTPATIENT
Start: 2025-01-20 | End: 2025-01-23 | Stop reason: HOSPADM

## 2025-01-20 RX ORDER — MAGNESIUM SULFATE HEPTAHYDRATE 40 MG/ML
2 INJECTION, SOLUTION INTRAVENOUS
Status: DISCONTINUED | OUTPATIENT
Start: 2025-01-20 | End: 2025-01-23 | Stop reason: HOSPADM

## 2025-01-20 RX ORDER — LOPERAMIDE HYDROCHLORIDE 2 MG/1
2 CAPSULE ORAL
Status: DISCONTINUED | OUTPATIENT
Start: 2025-01-20 | End: 2025-01-22 | Stop reason: HOSPADM

## 2025-01-20 RX ORDER — ONDANSETRON 8 MG/1
8 TABLET, ORALLY DISINTEGRATING ORAL
COMMUNITY
Start: 2024-09-09

## 2025-01-20 RX ORDER — METOCLOPRAMIDE HYDROCHLORIDE 5 MG/ML
10 INJECTION INTRAMUSCULAR; INTRAVENOUS EVERY 6 HOURS PRN
Status: DISCONTINUED | OUTPATIENT
Start: 2025-01-20 | End: 2025-01-22 | Stop reason: HOSPADM

## 2025-01-20 RX ORDER — HYDROXYZINE HYDROCHLORIDE 25 MG/1
100 TABLET, FILM COATED ORAL
Status: DISCONTINUED | OUTPATIENT
Start: 2025-01-20 | End: 2025-01-22 | Stop reason: HOSPADM

## 2025-01-20 RX ORDER — OXYTOCIN/0.9 % SODIUM CHLORIDE 30/500 ML
100-340 PLASTIC BAG, INJECTION (ML) INTRAVENOUS CONTINUOUS PRN
Status: DISCONTINUED | OUTPATIENT
Start: 2025-01-20 | End: 2025-01-23 | Stop reason: HOSPADM

## 2025-01-20 RX ORDER — PANTOPRAZOLE SODIUM 40 MG/1
40 TABLET, DELAYED RELEASE ORAL
Status: DISCONTINUED | OUTPATIENT
Start: 2025-01-20 | End: 2025-01-23 | Stop reason: HOSPADM

## 2025-01-20 RX ORDER — NALOXONE HYDROCHLORIDE 0.4 MG/ML
0.4 INJECTION, SOLUTION INTRAMUSCULAR; INTRAVENOUS; SUBCUTANEOUS
Status: DISCONTINUED | OUTPATIENT
Start: 2025-01-20 | End: 2025-01-22 | Stop reason: HOSPADM

## 2025-01-20 RX ORDER — OXYTOCIN/0.9 % SODIUM CHLORIDE 30/500 ML
340 PLASTIC BAG, INJECTION (ML) INTRAVENOUS CONTINUOUS PRN
Status: DISCONTINUED | OUTPATIENT
Start: 2025-01-20 | End: 2025-01-22 | Stop reason: HOSPADM

## 2025-01-20 RX ORDER — METHYLERGONOVINE MALEATE 0.2 MG/ML
200 INJECTION INTRAVENOUS
Status: DISCONTINUED | OUTPATIENT
Start: 2025-01-20 | End: 2025-01-22 | Stop reason: HOSPADM

## 2025-01-20 RX ORDER — MISOPROSTOL 200 UG/1
400 TABLET ORAL
Status: DISCONTINUED | OUTPATIENT
Start: 2025-01-20 | End: 2025-01-22 | Stop reason: HOSPADM

## 2025-01-20 RX ORDER — LIDOCAINE 40 MG/G
CREAM TOPICAL
Status: DISCONTINUED | OUTPATIENT
Start: 2025-01-20 | End: 2025-01-22 | Stop reason: HOSPADM

## 2025-01-20 RX ADMIN — MISOPROSTOL 25 MCG: 100 TABLET ORAL at 20:46

## 2025-01-20 RX ADMIN — ACETAMINOPHEN 650 MG: 325 TABLET ORAL at 20:59

## 2025-01-20 RX ADMIN — HYDROXYZINE HYDROCHLORIDE 100 MG: 25 TABLET ORAL at 20:54

## 2025-01-20 RX ADMIN — MISOPROSTOL 25 MCG: 100 TABLET ORAL at 16:36

## 2025-01-20 RX ADMIN — MORPHINE SULFATE 10 MG: 10 INJECTION, SOLUTION INTRAMUSCULAR; INTRAVENOUS at 22:06

## 2025-01-20 RX ADMIN — PANTOPRAZOLE SODIUM 40 MG: 40 TABLET, DELAYED RELEASE ORAL at 17:01

## 2025-01-20 ASSESSMENT — ACTIVITIES OF DAILY LIVING (ADL)
ADLS_ACUITY_SCORE: 15
ADLS_ACUITY_SCORE: 41
ADLS_ACUITY_SCORE: 15

## 2025-01-20 NOTE — H&P
"HISTORY AND PHYSICAL UPDATE ADMISSION EXAM    Name: Hilda Escamilla  YOB: 2000  Medical Record Number: [id number]    History of Present Illness: Hilda Escamilla is a 24 year old female who is 37w3d pregnant and being admitted for induction of labor, indication gHTN. She has had an increase in headaches, \"starry vision\", and swelling over the past few weeks. Her Bps over the weekend were elevated into the 150's/80-90's.    She feels well right now, though a little bit nervous about what to expect. She has \"a little pressure\" in her head right now but she wouldn't describe it as a headache. Normal vision right now.    She had a growth US on 25 which showed an EFW of 6-14 - 60th percentile. BPP this morning was 8/8 with normal fluid and vertex baby.    Estimated Date of Delivery: 2025    EGA 37w3d    OB History    Para Term  AB Living   1 0 0 0 0 0   SAB IAB Ectopic Multiple Live Births   0 0 0 0 0      # Outcome Date GA Lbr Alfred/2nd Weight Sex Type Anes PTL Lv   1 Current                 Lab Results   Component Value Date    HGB 11.3 (L) 2025       Prenatal Complications:   Hx kidney stone including in pregnancy. Has needed stents in the past  Anxiety/depression - non pharm tx  BMI 34  Hx hyperemesis in early pregnancy  Needs pap PP  Had 2 diastolic elevations above 90 in early pregnancy    Exam:      Temp 98.1  F (36.7  C) (Oral)   Resp 16   Ht 1.626 m (5' 4\")   Wt 116.1 kg (256 lb)   LMP 2024   BMI 43.94 kg/m      Fetal heart Rate Category 1  Contractions none    HEENT grossly normal  ABD gravid, non-tender  EXT:  +2 edema, moves freely  Vaginal exam closed  Membranes: intact    Assessment: induction of labor, indication gHTN    Plan: Admit - see IP orders  cervical ripening with misoprostol  Pain medication as desired - plans epidural  Theraputic sleep as desired  MD consultant on call Dr. Hernadez/ available prn  Anticipate     Prenatal " record reviewed.    Arpita Castellanos, APRN CNM      1/20/2025   4:11 PM

## 2025-01-21 ENCOUNTER — ANESTHESIA (OUTPATIENT)
Dept: OBGYN | Facility: CLINIC | Age: 25
End: 2025-01-21
Payer: COMMERCIAL

## 2025-01-21 ENCOUNTER — ANESTHESIA EVENT (OUTPATIENT)
Dept: OBGYN | Facility: CLINIC | Age: 25
End: 2025-01-21
Payer: COMMERCIAL

## 2025-01-21 LAB
ANION GAP SERPL CALCULATED.3IONS-SCNC: 12 MMOL/L (ref 7–15)
BUN SERPL-MCNC: 9.6 MG/DL (ref 6–20)
CALCIUM SERPL-MCNC: 9.3 MG/DL (ref 8.8–10.4)
CHLORIDE SERPL-SCNC: 103 MMOL/L (ref 98–107)
CREAT SERPL-MCNC: 0.8 MG/DL (ref 0.51–0.95)
EGFRCR SERPLBLD CKD-EPI 2021: >90 ML/MIN/1.73M2
ERYTHROCYTE [DISTWIDTH] IN BLOOD BY AUTOMATED COUNT: 13.5 % (ref 10–15)
GLUCOSE SERPL-MCNC: 80 MG/DL (ref 70–99)
HCO3 SERPL-SCNC: 20 MMOL/L (ref 22–29)
HCT VFR BLD AUTO: 35.1 % (ref 35–47)
HGB BLD-MCNC: 11.5 G/DL (ref 11.7–15.7)
MCH RBC QN AUTO: 27.1 PG (ref 26.5–33)
MCHC RBC AUTO-ENTMCNC: 32.8 G/DL (ref 31.5–36.5)
MCV RBC AUTO: 83 FL (ref 78–100)
PLATELET # BLD AUTO: 233 10E3/UL (ref 150–450)
POTASSIUM SERPL-SCNC: 4.3 MMOL/L (ref 3.4–5.3)
RBC # BLD AUTO: 4.24 10E6/UL (ref 3.8–5.2)
SODIUM SERPL-SCNC: 135 MMOL/L (ref 135–145)
WBC # BLD AUTO: 12.2 10E3/UL (ref 4–11)

## 2025-01-21 PROCEDURE — 36415 COLL VENOUS BLD VENIPUNCTURE: CPT | Performed by: ADVANCED PRACTICE MIDWIFE

## 2025-01-21 PROCEDURE — 00HU33Z INSERTION OF INFUSION DEVICE INTO SPINAL CANAL, PERCUTANEOUS APPROACH: ICD-10-PCS | Performed by: STUDENT IN AN ORGANIZED HEALTH CARE EDUCATION/TRAINING PROGRAM

## 2025-01-21 PROCEDURE — 10907ZC DRAINAGE OF AMNIOTIC FLUID, THERAPEUTIC FROM PRODUCTS OF CONCEPTION, VIA NATURAL OR ARTIFICIAL OPENING: ICD-10-PCS | Performed by: ADVANCED PRACTICE MIDWIFE

## 2025-01-21 PROCEDURE — 250N000011 HC RX IP 250 OP 636: Performed by: ADVANCED PRACTICE MIDWIFE

## 2025-01-21 PROCEDURE — 0UQGXZZ REPAIR VAGINA, EXTERNAL APPROACH: ICD-10-PCS | Performed by: ADVANCED PRACTICE MIDWIFE

## 2025-01-21 PROCEDURE — 85014 HEMATOCRIT: CPT | Performed by: ADVANCED PRACTICE MIDWIFE

## 2025-01-21 PROCEDURE — 258N000003 HC RX IP 258 OP 636: Performed by: ADVANCED PRACTICE MIDWIFE

## 2025-01-21 PROCEDURE — 250N000013 HC RX MED GY IP 250 OP 250 PS 637: Performed by: ADVANCED PRACTICE MIDWIFE

## 2025-01-21 PROCEDURE — 370N000003 HC ANESTHESIA WARD SERVICE: Performed by: STUDENT IN AN ORGANIZED HEALTH CARE EDUCATION/TRAINING PROGRAM

## 2025-01-21 PROCEDURE — 250N000011 HC RX IP 250 OP 636: Performed by: STUDENT IN AN ORGANIZED HEALTH CARE EDUCATION/TRAINING PROGRAM

## 2025-01-21 PROCEDURE — 10H07YZ INSERTION OF OTHER DEVICE INTO PRODUCTS OF CONCEPTION, VIA NATURAL OR ARTIFICIAL OPENING: ICD-10-PCS | Performed by: ADVANCED PRACTICE MIDWIFE

## 2025-01-21 PROCEDURE — 120N000001 HC R&B MED SURG/OB

## 2025-01-21 PROCEDURE — 82374 ASSAY BLOOD CARBON DIOXIDE: CPT | Performed by: ADVANCED PRACTICE MIDWIFE

## 2025-01-21 PROCEDURE — 80048 BASIC METABOLIC PNL TOTAL CA: CPT | Performed by: ADVANCED PRACTICE MIDWIFE

## 2025-01-21 PROCEDURE — 3E0R3BZ INTRODUCTION OF ANESTHETIC AGENT INTO SPINAL CANAL, PERCUTANEOUS APPROACH: ICD-10-PCS | Performed by: STUDENT IN AN ORGANIZED HEALTH CARE EDUCATION/TRAINING PROGRAM

## 2025-01-21 PROCEDURE — 250N000009 HC RX 250: Performed by: ADVANCED PRACTICE MIDWIFE

## 2025-01-21 PROCEDURE — 82435 ASSAY OF BLOOD CHLORIDE: CPT | Performed by: ADVANCED PRACTICE MIDWIFE

## 2025-01-21 RX ORDER — MORPHINE SULFATE 10 MG/ML
10 INJECTION, SOLUTION INTRAMUSCULAR; INTRAVENOUS
Status: COMPLETED | OUTPATIENT
Start: 2025-01-21 | End: 2025-01-21

## 2025-01-21 RX ORDER — LIDOCAINE 40 MG/G
CREAM TOPICAL
Status: DISCONTINUED | OUTPATIENT
Start: 2025-01-21 | End: 2025-01-22 | Stop reason: HOSPADM

## 2025-01-21 RX ORDER — SODIUM CHLORIDE, SODIUM LACTATE, POTASSIUM CHLORIDE, CALCIUM CHLORIDE 600; 310; 30; 20 MG/100ML; MG/100ML; MG/100ML; MG/100ML
INJECTION, SOLUTION INTRAVENOUS CONTINUOUS PRN
Status: DISCONTINUED | OUTPATIENT
Start: 2025-01-21 | End: 2025-01-22 | Stop reason: HOSPADM

## 2025-01-21 RX ORDER — MISOPROSTOL 100 UG/1
25 TABLET ORAL
Status: DISCONTINUED | OUTPATIENT
Start: 2025-01-21 | End: 2025-01-22 | Stop reason: HOSPADM

## 2025-01-21 RX ORDER — FENTANYL CITRATE-0.9 % NACL/PF 10 MCG/ML
100 PLASTIC BAG, INJECTION (ML) INTRAVENOUS EVERY 5 MIN PRN
Status: DISCONTINUED | OUTPATIENT
Start: 2025-01-21 | End: 2025-01-22 | Stop reason: HOSPADM

## 2025-01-21 RX ORDER — OXYTOCIN/0.9 % SODIUM CHLORIDE 30/500 ML
1-24 PLASTIC BAG, INJECTION (ML) INTRAVENOUS CONTINUOUS
Status: DISCONTINUED | OUTPATIENT
Start: 2025-01-21 | End: 2025-01-22 | Stop reason: HOSPADM

## 2025-01-21 RX ORDER — NALBUPHINE HYDROCHLORIDE 10 MG/ML
2.5-5 INJECTION INTRAMUSCULAR; INTRAVENOUS; SUBCUTANEOUS EVERY 6 HOURS PRN
Status: DISCONTINUED | OUTPATIENT
Start: 2025-01-21 | End: 2025-01-23 | Stop reason: HOSPADM

## 2025-01-21 RX ORDER — FENTANYL/ROPIVACAINE/NS/PF 2MCG/ML-.1
PLASTIC BAG, INJECTION (ML) EPIDURAL
Status: DISCONTINUED | OUTPATIENT
Start: 2025-01-21 | End: 2025-01-22 | Stop reason: HOSPADM

## 2025-01-21 RX ORDER — MORPHINE SULFATE 10 MG/ML
10 INJECTION, SOLUTION INTRAMUSCULAR; INTRAVENOUS ONCE
Status: COMPLETED | OUTPATIENT
Start: 2025-01-21 | End: 2025-01-21

## 2025-01-21 RX ADMIN — ACETAMINOPHEN 650 MG: 325 TABLET ORAL at 23:14

## 2025-01-21 RX ADMIN — MORPHINE SULFATE 10 MG: 10 INJECTION, SOLUTION INTRAMUSCULAR; INTRAVENOUS at 02:22

## 2025-01-21 RX ADMIN — SODIUM CHLORIDE, POTASSIUM CHLORIDE, SODIUM LACTATE AND CALCIUM CHLORIDE 500 ML: 600; 310; 30; 20 INJECTION, SOLUTION INTRAVENOUS at 12:37

## 2025-01-21 RX ADMIN — MISOPROSTOL 25 MCG: 100 TABLET ORAL at 06:04

## 2025-01-21 RX ADMIN — Medication: at 19:04

## 2025-01-21 RX ADMIN — MISOPROSTOL 25 MCG: 100 TABLET ORAL at 08:32

## 2025-01-21 RX ADMIN — MISOPROSTOL 25 MCG: 100 TABLET ORAL at 04:00

## 2025-01-21 RX ADMIN — ONDANSETRON 4 MG: 2 INJECTION, SOLUTION INTRAMUSCULAR; INTRAVENOUS at 23:02

## 2025-01-21 RX ADMIN — Medication: at 22:58

## 2025-01-21 RX ADMIN — MISOPROSTOL 25 MCG: 100 TABLET ORAL at 01:47

## 2025-01-21 RX ADMIN — MORPHINE SULFATE 10 MG: 10 INJECTION, SOLUTION INTRAMUSCULAR; INTRAVENOUS at 13:13

## 2025-01-21 RX ADMIN — ONDANSETRON 4 MG: 2 INJECTION, SOLUTION INTRAMUSCULAR; INTRAVENOUS at 12:30

## 2025-01-21 RX ADMIN — MISOPROSTOL 25 MCG: 100 TABLET ORAL at 10:30

## 2025-01-21 RX ADMIN — Medication: at 15:31

## 2025-01-21 RX ADMIN — Medication 2 MILLI-UNITS/MIN: at 23:24

## 2025-01-21 ASSESSMENT — ACTIVITIES OF DAILY LIVING (ADL)
ADLS_ACUITY_SCORE: 15

## 2025-01-21 NOTE — ANESTHESIA PROCEDURE NOTES
Epidural catheter Procedure Note    Pre-Procedure   Staff -        Anesthesiologist:  Venkat Lovell DO       Performed By: anesthesiologist       Location: OB       Procedure Start/Stop Times: 1/21/2025 3:20 PM and 1/21/2025 3:37 PM       Pre-Anesthestic Checklist: patient identified, IV checked, risks and benefits discussed, informed consent, monitors and equipment checked, pre-op evaluation, at physician/surgeon's request and post-op pain management  Timeout:       Correct Patient: Yes        Correct Procedure: Yes        Correct Site: Yes        Correct Position: Yes   Procedure Documentation  Procedure: epidural catheter         Patient Position: sitting       Patient Prep/Sterile Barriers: sterile gloves, mask, patient draped       Skin prep: Betadine and Chloraprep       Local skin infiltrated with mL of 1% lidocaine.        Insertion Site: L2-3. (midline approach).       Technique: LORT saline and LORT air        CORNELL at 6.5 cm.       Needle Type: ToMgvy needle       Needle Gauge: 17.        Needle Length (Inches): 3.5        Catheter: 20 G.          Catheter threaded easily.           Threaded 12 cm at skin.         # of attempts: 1 and  # of redirects:  0    Assessment/Narrative         Paresthesias: No.       Test dose of 3 mL lidocaine 1.5% w/ 1:200,000 epinephrine at 15:30 CST.         Test dose negative, 3 minutes after injection, for signs of intravascular, subdural, or intrathecal injection.       Insertion/Infusion Method: LORT saline and LORT air       Aspiration negative for Heme or CSF via Epidural Catheter.    Medication(s) Administered   Medication Administration Time: 1/21/2025 3:20 PM     Comments:  Epi   R/b/a discussed, patient agrees to have an epidural catheter placement.   Local infiltration of skin, advancement of needle without paresthesias, excellent Cornell to NS.   Catheter advanced without resistance nor paresthesias, negative aspiration for heme or CSF.  Remaining 2 mL  "epidural test dose diluted with 3 mL sterile water and given via epidural bolus.  Patient tolerated the procedure well, all questions answered.        FOR [facility] ONLY:   Pain Team Contact information: please page the Pain Team Via [employer]. Search \"Pain\". During daytime hours, please page the attending first. At night please page the resident first.      "

## 2025-01-21 NOTE — ANESTHESIA PREPROCEDURE EVALUATION
Anesthesia Pre-Procedure Evaluation    Patient: Hilda Escamilla   MRN: [id number] : 2000        Procedure :           No past medical history on file.   Past Surgical History:   Procedure Laterality Date     AS RAD RESEC TONSIL/PILLARS        No Known Allergies   Social History     Tobacco Use     Smoking status: Never     Smokeless tobacco: Never   Substance Use Topics     Alcohol use: Not Currently      Wt Readings from Last 1 Encounters:   25 116.1 kg (256 lb)        Anesthesia Evaluation            ROS/MED HX  ENT/Pulmonary:  - neg pulmonary ROS     Neurologic:       Cardiovascular:     (+)  hypertension- -   -  - -                                      METS/Exercise Tolerance:     Hematologic:       Musculoskeletal:       GI/Hepatic:  - neg GI/hepatic ROS     Renal/Genitourinary:       Endo:     (+)               Obesity,       Psychiatric/Substance Use:       Infectious Disease:       Malignancy:       Other:      (+) Possibly pregnant, , ,       Physical Exam    Airway        Mallampati: II   TM distance: > 3 FB   Neck ROM: full     Respiratory Devices and Support         Dental       (+) Minor Abnormalities - some fillings, tiny chips      Cardiovascular   cardiovascular exam normal          Pulmonary   pulmonary exam normal            OUTSIDE LABS:  CBC:   Lab Results   Component Value Date    WBC 12.2 (H) 2025    WBC 10.7 2025    HGB 11.5 (L) 2025    HGB 11.3 (L) 2025    HCT 35.1 2025    HCT 34.8 (L) 2025     2025     2025     BMP:   Lab Results   Component Value Date     2025     2025    POTASSIUM 4.3 2025    POTASSIUM 4.1 2025    CHLORIDE 103 2025    CHLORIDE 109 (H) 2025    CO2 20 (L) 2025    CO2 21 (L) 2025    BUN 9.6 2025    BUN 9.2 2025    CR 0.80 2025    CR 0.65 2025    GLC 80 2025    GLC 96 2025     COAGS: No results found  "for: \"PTT\", \"INR\", \"FIBR\"  POC: No results found for: \"BGM\", \"HCG\", \"HCGS\"  HEPATIC:   Lab Results   Component Value Date    ALBUMIN 3.4 (L) 01/20/2025    PROTTOTAL 6.4 01/20/2025    ALT 12 01/20/2025    AST 20 01/20/2025    ALKPHOS 160 (H) 01/20/2025    BILITOTAL <0.2 01/20/2025     OTHER:   Lab Results   Component Value Date    ILIA 9.3 01/21/2025    MAG 2.0 08/11/2023    LIPASE 19 06/18/2024       Anesthesia Plan    ASA Status:  3    NPO Status:  NPO Appropriate    Anesthesia Type: Epidural.              Consents    Anesthesia Plan(s) and associated risks, benefits, and realistic alternatives discussed. Questions answered and patient/representative(s) expressed understanding.     - Discussed: Risks, Benefits and Alternatives for BOTH SEDATION and the PROCEDURE were discussed     - Discussed with:  Patient      - Extended Intubation/Ventilatory Support Discussed: No.      - Patient is DNR/DNI Status: No     Use of blood products discussed: No .     Postoperative Care    Pain management: IV analgesics, Oral pain medications.   PONV prophylaxis: Ondansetron (or other 5HT-3), Dexamethasone or Solumedrol     Comments:             Venkat Lovell DO    I have reviewed the pertinent notes and labs in the chart from the past 30 days and (re)examined the patient.  Any updates or changes from those notes are reflected in this note.    Clinically Significant Risk Factors Present on Admission          # Hyperchloremia: Highest Cl = 109 mmol/L in last 2 days, will monitor as appropriate          # Hypoalbuminemia: Lowest albumin = 3.4 g/dL at 1/20/2025  4:00 PM, will monitor as appropriate                            "

## 2025-01-21 NOTE — PROGRESS NOTES
"Patient Name:  Hilda Escamilla  :      2000  MRN:      [id number]    Assessment:     at 37w4d  IOL labor  Category 1 FHTs  IOL gHTN  S/p 2 doses vaginal cytotec and 4doses oral  Kidney stones during pregnancy  Anxiety/depression - non pharm tx  Pre-preg BMI 34  Mild anemia -11.3 on admit  25 EFW 6-14, 60%      Plan:   -continue oral cytotec for 12 doses or if change in status  -Routine support & management. Encourage position changes, ambulation as appropriate, rest as desired.  -Anticipate progress and     Subjective:  Hilda Escamilla is doing well. Not feeling contractions.  Is tired from a restless night so is hoping to get in a few naps during the day. FOB at bedside and supportive      Objective:  BP (!) 130/91 (BP Location: Left arm, Patient Position: Semi-Pillai's, Cuff Size: Adult Large)   Pulse 80   Temp 98.2  F (36.8  C) (Oral)   Resp 17   Ht 1.626 m (5' 4\")   Wt 116.1 kg (256 lb)   LMP 2024   SpO2 98%   BMI 43.94 kg/m      FHR:Baseline:Cat 1    Uterine contractions 4-8 mild  SVE:/-1 firm, mid per  RN at 0826    Provider: Hilda Parker CNM  .Dr. Ludwig aware of patient status and remains available for consultation and collaboration as needed.     Date:  2025  Time:  10:17 AM  "

## 2025-01-21 NOTE — PLAN OF CARE
Goal Outcome Evaluation:    Patient admitted for IOL for GHTN. BP not currently treatable. She reports intermittent headache and vision changes over the past few weeks but none currently. Reflexes normal, no clonus present. She is having some cramping after her dose of cytotec. Cat I strip present.   Diana Carlos RN on 1/20/2025 at 6:37 PM

## 2025-01-21 NOTE — PROGRESS NOTES
Will reorder HEELP labs d/t consistent readings of 140's/90's this morning. Denies any new symptoms

## 2025-01-21 NOTE — PROGRESS NOTES
"Patient Name:  Hilda Escamilla  :      2000  MRN:      [id number]    Assessment:     at 37w4d  Induction of labor gHTN  Category 1 FHTs  SROM      Plan:   -IUPC placed without difficulty and with patient consent secondary to difficulty monitoring contractions.   -Pitocin augmentation if needed to achieve adequate contraction pattern  -Routine support & management. Encourage position changes, rest as desired.  -Anticipate progress and NSVB.   -Reevaluate progress in 4 hours or sooner with a change in status.     Subjective:  Hilda Escamilla is coping well with contractions. Using epidural for pain relief. Noting rectal pressure with contractions and is also feeling shaky. She reports SROM at 1103 of a small amount of clear fluid. At 1503, copious amounts of clear fluid was noted with RN SVE. Contraction intensity strengthened after SROM.        Objective:  BP (!) 140/86 (BP Location: Right arm, Patient Position: Semi-Pillai's, Cuff Size: Adult Large)   Pulse 90   Temp 98  F (36.7  C) (Oral)   Resp 17   Ht 1.626 m (5' 4\")   Wt 116.1 kg (256 lb)   LMP 2024   SpO2 98%   BMI 43.94 kg/m      FHR:Cat 1     Uterine contractions: 2-3    SVE:3/90/-1    Provider: Hilda Parker CNM      Date:  2025  Time:  4:17 PM  "

## 2025-01-21 NOTE — PROGRESS NOTES
Talked to Hilda Parker CNM about patient uterine pattern, pain, and SVE. Order to give morphine 10mg if pt wants. If contractions slow down, resume Cytotec due to SVE.    Megan Melendez RN

## 2025-01-22 PROCEDURE — 250N000011 HC RX IP 250 OP 636: Performed by: ADVANCED PRACTICE MIDWIFE

## 2025-01-22 PROCEDURE — 999N000016 HC STATISTIC ATTENDANCE AT DELIVERY

## 2025-01-22 PROCEDURE — 999N000157 HC STATISTIC RCP TIME EA 10 MIN

## 2025-01-22 PROCEDURE — 250N000011 HC RX IP 250 OP 636: Performed by: STUDENT IN AN ORGANIZED HEALTH CARE EDUCATION/TRAINING PROGRAM

## 2025-01-22 PROCEDURE — 250N000013 HC RX MED GY IP 250 OP 250 PS 637: Performed by: ADVANCED PRACTICE MIDWIFE

## 2025-01-22 PROCEDURE — 250N000009 HC RX 250: Performed by: ADVANCED PRACTICE MIDWIFE

## 2025-01-22 PROCEDURE — 722N000001 HC LABOR CARE VAGINAL DELIVERY SINGLE

## 2025-01-22 PROCEDURE — 120N000001 HC R&B MED SURG/OB

## 2025-01-22 RX ORDER — MISOPROSTOL 200 UG/1
400 TABLET ORAL
Status: DISCONTINUED | OUTPATIENT
Start: 2025-01-22 | End: 2025-01-23 | Stop reason: HOSPADM

## 2025-01-22 RX ORDER — HYDROCORTISONE 25 MG/G
CREAM TOPICAL 3 TIMES DAILY PRN
Status: DISCONTINUED | OUTPATIENT
Start: 2025-01-22 | End: 2025-01-23 | Stop reason: HOSPADM

## 2025-01-22 RX ORDER — ACETAMINOPHEN 325 MG/1
650 TABLET ORAL EVERY 4 HOURS PRN
Status: DISCONTINUED | OUTPATIENT
Start: 2025-01-22 | End: 2025-01-23 | Stop reason: HOSPADM

## 2025-01-22 RX ORDER — TRANEXAMIC ACID 10 MG/ML
1 INJECTION, SOLUTION INTRAVENOUS EVERY 30 MIN PRN
Status: DISCONTINUED | OUTPATIENT
Start: 2025-01-22 | End: 2025-01-23 | Stop reason: HOSPADM

## 2025-01-22 RX ORDER — CARBOPROST TROMETHAMINE 250 UG/ML
250 INJECTION, SOLUTION INTRAMUSCULAR
Status: DISCONTINUED | OUTPATIENT
Start: 2025-01-22 | End: 2025-01-23 | Stop reason: HOSPADM

## 2025-01-22 RX ORDER — OXYCODONE HYDROCHLORIDE 5 MG/1
5 TABLET ORAL EVERY 4 HOURS PRN
Status: DISCONTINUED | OUTPATIENT
Start: 2025-01-22 | End: 2025-01-23 | Stop reason: HOSPADM

## 2025-01-22 RX ORDER — OXYTOCIN 10 [USP'U]/ML
10 INJECTION, SOLUTION INTRAMUSCULAR; INTRAVENOUS
Status: DISCONTINUED | OUTPATIENT
Start: 2025-01-22 | End: 2025-01-23 | Stop reason: HOSPADM

## 2025-01-22 RX ORDER — BISACODYL 10 MG
10 SUPPOSITORY, RECTAL RECTAL DAILY PRN
Status: DISCONTINUED | OUTPATIENT
Start: 2025-01-22 | End: 2025-01-23 | Stop reason: HOSPADM

## 2025-01-22 RX ORDER — MISOPROSTOL 200 UG/1
800 TABLET ORAL
Status: DISCONTINUED | OUTPATIENT
Start: 2025-01-22 | End: 2025-01-23 | Stop reason: HOSPADM

## 2025-01-22 RX ORDER — DOCUSATE SODIUM 100 MG/1
100 CAPSULE, LIQUID FILLED ORAL DAILY
Status: DISCONTINUED | OUTPATIENT
Start: 2025-01-22 | End: 2025-01-23 | Stop reason: HOSPADM

## 2025-01-22 RX ORDER — NALOXONE HYDROCHLORIDE 0.4 MG/ML
0.4 INJECTION, SOLUTION INTRAMUSCULAR; INTRAVENOUS; SUBCUTANEOUS
Status: DISCONTINUED | OUTPATIENT
Start: 2025-01-22 | End: 2025-01-23 | Stop reason: HOSPADM

## 2025-01-22 RX ORDER — NALOXONE HYDROCHLORIDE 0.4 MG/ML
0.2 INJECTION, SOLUTION INTRAMUSCULAR; INTRAVENOUS; SUBCUTANEOUS
Status: DISCONTINUED | OUTPATIENT
Start: 2025-01-22 | End: 2025-01-23 | Stop reason: HOSPADM

## 2025-01-22 RX ORDER — IBUPROFEN 800 MG/1
800 TABLET, FILM COATED ORAL EVERY 6 HOURS PRN
Status: DISCONTINUED | OUTPATIENT
Start: 2025-01-22 | End: 2025-01-23 | Stop reason: HOSPADM

## 2025-01-22 RX ORDER — OXYTOCIN/0.9 % SODIUM CHLORIDE 30/500 ML
340 PLASTIC BAG, INJECTION (ML) INTRAVENOUS CONTINUOUS PRN
Status: DISCONTINUED | OUTPATIENT
Start: 2025-01-22 | End: 2025-01-23 | Stop reason: HOSPADM

## 2025-01-22 RX ORDER — LOPERAMIDE HYDROCHLORIDE 2 MG/1
2 CAPSULE ORAL
Status: DISCONTINUED | OUTPATIENT
Start: 2025-01-22 | End: 2025-01-23 | Stop reason: HOSPADM

## 2025-01-22 RX ORDER — MODIFIED LANOLIN
OINTMENT (GRAM) TOPICAL
Status: DISCONTINUED | OUTPATIENT
Start: 2025-01-22 | End: 2025-01-23 | Stop reason: HOSPADM

## 2025-01-22 RX ORDER — LOPERAMIDE HYDROCHLORIDE 2 MG/1
4 CAPSULE ORAL
Status: DISCONTINUED | OUTPATIENT
Start: 2025-01-22 | End: 2025-01-23 | Stop reason: HOSPADM

## 2025-01-22 RX ORDER — ENOXAPARIN SODIUM 100 MG/ML
40 INJECTION SUBCUTANEOUS EVERY 12 HOURS
Status: DISCONTINUED | OUTPATIENT
Start: 2025-01-22 | End: 2025-01-23 | Stop reason: HOSPADM

## 2025-01-22 RX ORDER — METHYLERGONOVINE MALEATE 0.2 MG/ML
200 INJECTION INTRAVENOUS
Status: DISCONTINUED | OUTPATIENT
Start: 2025-01-22 | End: 2025-01-23 | Stop reason: HOSPADM

## 2025-01-22 RX ADMIN — ACETAMINOPHEN 650 MG: 325 TABLET ORAL at 06:30

## 2025-01-22 RX ADMIN — IBUPROFEN 800 MG: 800 TABLET, FILM COATED ORAL at 12:03

## 2025-01-22 RX ADMIN — Medication: at 03:00

## 2025-01-22 RX ADMIN — DOCUSATE SODIUM 100 MG: 100 CAPSULE, LIQUID FILLED ORAL at 10:07

## 2025-01-22 RX ADMIN — KETOROLAC TROMETHAMINE 30 MG: 30 INJECTION, SOLUTION INTRAMUSCULAR at 06:30

## 2025-01-22 RX ADMIN — BENZOCAINE AND LEVOMENTHOL: 200; 5 SPRAY TOPICAL at 14:42

## 2025-01-22 RX ADMIN — IBUPROFEN 800 MG: 800 TABLET, FILM COATED ORAL at 18:43

## 2025-01-22 RX ADMIN — ACETAMINOPHEN 650 MG: 325 TABLET ORAL at 22:17

## 2025-01-22 RX ADMIN — ENOXAPARIN SODIUM 40 MG: 40 INJECTION SUBCUTANEOUS at 18:43

## 2025-01-22 RX ADMIN — ACETAMINOPHEN 650 MG: 325 TABLET ORAL at 14:42

## 2025-01-22 RX ADMIN — MISOPROSTOL 800 MCG: 200 TABLET ORAL at 04:56

## 2025-01-22 RX ADMIN — ACETAMINOPHEN 650 MG: 325 TABLET ORAL at 18:49

## 2025-01-22 RX ADMIN — LIDOCAINE HYDROCHLORIDE 20 ML: 10 INJECTION, SOLUTION EPIDURAL; INFILTRATION; INTRACAUDAL; PERINEURAL at 04:49

## 2025-01-22 ASSESSMENT — ACTIVITIES OF DAILY LIVING (ADL)
ADLS_ACUITY_SCORE: 20
ADLS_ACUITY_SCORE: 25
ADLS_ACUITY_SCORE: 32
ADLS_ACUITY_SCORE: 32
ADLS_ACUITY_SCORE: 25
ADLS_ACUITY_SCORE: 20
ADLS_ACUITY_SCORE: 25
ADLS_ACUITY_SCORE: 32
ADLS_ACUITY_SCORE: 25
ADLS_ACUITY_SCORE: 20
ADLS_ACUITY_SCORE: 25

## 2025-01-22 NOTE — PLAN OF CARE
Pt. Had an  of a viable baby girl at 0447. Mom and baby bonding. Working on breastfeeding.     Goal Outcome Evaluation:      Plan of Care Reviewed With: patient    Overall Patient Progress: improvingOverall Patient Progress: improving

## 2025-01-22 NOTE — PLAN OF CARE
Problem: Postpartum (Vaginal Delivery)  Goal: Hemostasis  1/22/2025 1618 by Brooklyn Calvillo RN  Outcome: Progressing    Problem: Postpartum (Vaginal Delivery)  Goal: Effective Urinary Elimination  1/22/2025 1618 by Brooklyn Calvillo RN  Outcome: Progressing    Problem: Postpartum (Vaginal Delivery)  Goal: Optimal Pain Control and Function  1/22/2025 1618 by Brooklyn Calvillo RN  Outcome: Progressing    VSS this shift ([id number]). Pt complaining of uterine cramping and pain that radiates to lower back. Pain is being managed with PRN Tylenol and Ibuprofen this shift. Pt has tucks and dermoplast spray in the room for perineum. Bleeding is light. Fundus firm and midline. Pt voids spontaneously in the room but urine is pretty dark yellow. See previous note for provider notification. Pt is bonding well with baby and attentive to baby. Education provided as appropriate throughout the shift.

## 2025-01-22 NOTE — PROGRESS NOTES
Pt was found to be complete at 0111 and started pushing around 0120. She has been pushing in a few different positions with mild to moderate efforts. Station was 0 at the start of 2nd stage. Is now +2-3. Cat 1 tracing. Ctx coupling and will space out to 6 minutes pitocin at 8mu/min. Will continue to monitor progress

## 2025-01-22 NOTE — PROGRESS NOTES
"Patient Name:  Hilda Escamilla  :      2000  MRN:      [id number]    Assessment:     at 37w4d  Transition phase of labor  Category 1 FHTs  SROM  gHTN  Periods of maternal tachycardia      Plan:  -Pitocin augmentation to be started   -Continue position changes to optimize fetal descent.  -Routine support & management.   -Anticipate progress and    -Reevaluate progress in 2 hours or sooner with a change in status.     Subjective:  Hilda Escamilla is coping well with contractions. Using epidural for pain relief. Has \"zero pain.\"  Notes occ rectal pressure. Had nausea that improved after zofran. Continues to leak copious amounts of clear fluid  Pt has periods of tachycardia (asymptomatic). Ranging from the 80s -143)    Objective:  /71 (BP Location: Left arm, Patient Position: Semi-Pillai's, Cuff Size: Adult Regular)   Pulse 90   Temp 98.5  F (36.9  C) (Oral)   Resp 18   Ht 1.626 m (5' 4\")   Wt 116.1 kg (256 lb)   LMP 2024   SpO2 96%   BMI 43.94 kg/m      FHR:Baseline: Cat 1  Uterine contractions:2-5and Intensity: moderate- strong    SVE:/-1 with some caput and anterior cervical edema. Dilatation essentially unchanged from     Provider: Hilda Parker CNM      Date:  2025  Time:  11:25 PM  "

## 2025-01-22 NOTE — PROVIDER NOTIFICATION
Pt urine has been brown/juani with sediment. Pt reports that it is not painful when she voids. RN called and notified Kristin Turner CNM. No new orders at this time. Per Kristin, continue to watch pt output and notify provider if pt becomes symptomatic when voiding.

## 2025-01-22 NOTE — L&D DELIVERY NOTE
Vaginal Delivery Note    Name: Hilda Escamilla  : 2000  MRN: [id number]    PRE DELIVERY DIAGNOSIS  1) 24 year old  1 Para 0 at 37w5d      2) gHTN  3) Kidney stones during pregnancy  4) Anxiety/depression - non pharm tx  5) Pre-preg BMI 34- Current 43.95  6) Mild anemia -  7) Rh-    POST DELIVERY DIAGNOSIS  1) 24 year old  @ 37w5d  2) Delivery of a viable infant weighing   via     YOB: 2026    Birth Time: 4:47 AM      Augmentation Yes              Indication: ineffective contraction pattern  Induction Yes                      Indication: gHTN    Monitors: External, FSE, and IUPC     GBS: Negative    ROM: SROM  Fluid Type: Clear    Labor Analgesia/Anesthesia:Epidural    Cord pH obtained: No  Placenta Date/Time: 2025  4:50 AM  Placenta submitted to Pathology: No    Description of procedure:   24 year old  with PNC w/ MWC and pregnancy complicated by  see above  presented to L&D with plan for induction.  Her hospital course was uncomplicated,until second stage;. Prolonged second stage and fetal heart rate abnormalities.    FSE placed at 0410 (just under 3hrs hrs of pushing) due to audible decelerations and difficulties tracing. Dr Ludwig called for possible vacuum due to above 2nd stage complications. Pt continued to push with Dr Ludwig in house and started to make make excellent progress.  She effectively pushed without intervention to a   Patient progressed to complete with spontaneous rupture of membranes and pitocin Pitocin was started when pt was 9cm.  Shoulder Dystocia No  Operative Vaginal Delivery No  Infant spontaneously delivered over a  small vaginal  laceration  It was repaired in the normal fashion using epidural anesthesia and local anesthesia using 3-0 vicryl.  Infant delivered in RAMAN position.  Nuchal cord Yes and body cord   Delivered through    Placenta spontaneously delivered: 2025  4:50 AM grossly intact with 3 vessel cord.  Infant:  Live,  lethargic and Initially stunned infant  was handed to mom.  The cord was clapped and cut just prior to the 1min APGAR and brought over the the awaiting  NP.  See their note.  Baby stimulated and then brought back over to mom after about 5min at the warmer  Delivery was complicated by nothing. Interventions included fundal massage, pitocin, and cytotec.    Delivery QBL (mL): 100    Mother and Infant stable.    Hilda Parker CNM  2025 5:04 AM

## 2025-01-22 NOTE — PROVIDER NOTIFICATION
Hilda Parker [facility] told about SVE, order to check pt in two hours, if unchanged start pitocin.     Alerted her about pt's HR. No new orders at this time. Pt alec every 2-3 minutes, MVU's are roughly 150. Pt has epidural in and running, IV fluids running at 75 ml/hr.    Megan Melendez RN

## 2025-01-22 NOTE — ANESTHESIA POSTPROCEDURE EVALUATION
Patient: Hilda Escamilla    Procedure: * No procedures listed *       Anesthesia Type:  Epidural    Note:  Disposition: Inpatient   Postop Pain Control: Uneventful            Sign Out: Well controlled pain   PONV: No   Neuro/Psych: Uneventful            Sign Out: Acceptable/Baseline neuro status   Airway/Respiratory: Uneventful            Sign Out: Acceptable/Baseline resp. status   CV/Hemodynamics: Uneventful            Sign Out: Acceptable CV status; No obvious hypovolemia; No obvious fluid overload   Other NRE:    DID A NON-ROUTINE EVENT OCCUR?        Last vitals:  Vitals:    01/22/25 0500 01/22/25 0520 01/22/25 0530   BP: (!) 164/95 133/62 128/68   Pulse:      Resp:  18    Temp:  36.9  C (98.4  F)    SpO2: 96% 94% 96%       Electronically Signed By: Abdulkadir Negron MD  January 22, 2025  6:16 AM

## 2025-01-22 NOTE — LACTATION NOTE
This note was copied from a baby's chart.  This RN educated parents about hand expression, how to achieve a deep latch, hunger cues, side-lying bottle feeding, and burping. RN also explained bottle feeding and what options the hospital has available for baby such as formula and/or donor breast milk. MOB has a desire to breastfeed in the hospital and plans to pump and bottle feed at home with some breastfeeding. RN encouraged parents to call for help as needed.     After educating about hand expression, MOB was able to successfully hand express multiple drops of colostrum. Baby showed little interest in latching so was finger fed the expressed colostrum. With encouragement, baby eventually sucked on gloved finger but was uncoordinated at first. Since the baby had not had a successful feeding at 6 hours of life, parents decided they wanted to bottle feed with donor breast milk. Plan to work on breastfeeding throughout their stay.     Education booklet provided to pt and explained. Encouraged parents to document feeds and diapers in the log.

## 2025-01-23 VITALS
HEART RATE: 88 BPM | WEIGHT: 256 LBS | SYSTOLIC BLOOD PRESSURE: 126 MMHG | OXYGEN SATURATION: 94 % | RESPIRATION RATE: 16 BRPM | TEMPERATURE: 97.9 F | HEIGHT: 64 IN | DIASTOLIC BLOOD PRESSURE: 84 MMHG | BODY MASS INDEX: 43.71 KG/M2

## 2025-01-23 PROBLEM — O13.9 GESTATIONAL HYPERTENSION: Status: ACTIVE | Noted: 2025-01-23

## 2025-01-23 LAB — HGB BLD-MCNC: 10.1 G/DL (ref 11.7–15.7)

## 2025-01-23 PROCEDURE — 36415 COLL VENOUS BLD VENIPUNCTURE: CPT | Performed by: ADVANCED PRACTICE MIDWIFE

## 2025-01-23 PROCEDURE — 85018 HEMOGLOBIN: CPT | Performed by: ADVANCED PRACTICE MIDWIFE

## 2025-01-23 PROCEDURE — 250N000013 HC RX MED GY IP 250 OP 250 PS 637: Performed by: ADVANCED PRACTICE MIDWIFE

## 2025-01-23 PROCEDURE — 250N000011 HC RX IP 250 OP 636: Performed by: ADVANCED PRACTICE MIDWIFE

## 2025-01-23 RX ORDER — DOCUSATE SODIUM 100 MG/1
100 CAPSULE, LIQUID FILLED ORAL DAILY
COMMUNITY
Start: 2025-01-23

## 2025-01-23 RX ORDER — IBUPROFEN 800 MG/1
800 TABLET, FILM COATED ORAL EVERY 6 HOURS PRN
COMMUNITY
Start: 2025-01-23

## 2025-01-23 RX ORDER — ACETAMINOPHEN 325 MG/1
650 TABLET ORAL EVERY 4 HOURS PRN
COMMUNITY
Start: 2025-01-23

## 2025-01-23 RX ADMIN — ENOXAPARIN SODIUM 40 MG: 40 INJECTION SUBCUTANEOUS at 06:30

## 2025-01-23 RX ADMIN — DOCUSATE SODIUM 100 MG: 100 CAPSULE, LIQUID FILLED ORAL at 09:43

## 2025-01-23 RX ADMIN — IBUPROFEN 800 MG: 800 TABLET, FILM COATED ORAL at 00:36

## 2025-01-23 RX ADMIN — IBUPROFEN 800 MG: 800 TABLET, FILM COATED ORAL at 06:30

## 2025-01-23 RX ADMIN — ACETAMINOPHEN 650 MG: 325 TABLET ORAL at 05:32

## 2025-01-23 ASSESSMENT — ACTIVITIES OF DAILY LIVING (ADL)
ADLS_ACUITY_SCORE: 25

## 2025-01-23 NOTE — PROGRESS NOTES
"Vaginal Delivery Postpartum Day 1    Patient Name:  Hilda Escamilla  :      2000  MRN:      [id number]      Assessment:  Normal postpartum course with acute blood loss anemia.    Plan:  Continue current care.      Subjective:  The patient feels well:  Voiding without difficulty, lochia normal, tolerating normal diet, ambulating without difficulty and passing flatus. Voiding independently without complication. Pain is well controlled with current medications.  The patient has no emotional concerns.  The baby is well and being fed by breast.      YOB: 2026  Birth Time: 4:47 AM    Prenatal Complications include:   Hx kidney stone including in pregnancy. Has needed stents in the past  Anxiety/depression - non pharm tx  BMI 34  Hx hyperemesis in early pregnancy  Needs pap PP  Had 2 diastolic elevations above 90 in early pregnancy       Objective:  /80 (BP Location: Left arm, Patient Position: Semi-Pillai's)   Pulse 83   Temp 98.3  F (36.8  C) (Oral)   Resp 16   Ht 1.626 m (5' 4\")   Wt 116.1 kg (256 lb)   LMP 2024   SpO2 94%   Breastfeeding Unknown   BMI 43.94 kg/m    Patient Vitals for the past 24 hrs:   BP Temp Temp src Pulse Resp SpO2   25 0500 121/80 98.3  F (36.8  C) Oral 83 16 --   25 0035 139/86 98.2  F (36.8  C) Oral 90 16 --   25 2000 132/72 98.4  F (36.9  C) Oral 101 16 --   25 1530 124/85 98.8  F (37.1  C) Oral 89 16 94 %   25 1100 125/79 98.2  F (36.8  C) Oral 95 16 --       Exam: Patient A&O x 3. No acute distress, breathing unlabored.The amount and color of the lochia is appropriate for the duration of recovery. Uterine fundus is firm per RN exam. Perineum: not examined.     Lab Results   Component Value Date    AS Negative 2025    HGB 10.1 (L) 2025       Immunization History   Administered Date(s) Administered    COVID-19 Monovalent 12+ (Pfizer ) 2022    TDAP (Adacel,Boostrix) 2024       Provider:  " Ankit Farah CNM    Date:  1/23/2025  Time:  9:16 AM

## 2025-01-23 NOTE — DISCHARGE INSTRUCTIONS
Warning Signs after Having a Baby    Keep this paper on your fridge or somewhere else where you can see it.    Call your provider if you have any of these symptoms up to 12 weeks after having your baby.    Thoughts of hurting yourself or your baby  Pain in your chest or trouble breathing  Severe headache not helped by pain medicine  Eyesight concerns (blurry vision, seeing spots or flashes of light, other changes to eyesight)  Fainting, shaking or other signs of a seizure    Call 9-1-1 if you feel that it is an emergency.     The symptoms below can happen to anyone after giving birth. They can be very serious. Call your provider if you have any of these warning signs.    My provider s phone number: _______________________    Losing too much blood (hemorrhage)    Call your provider if you soak through a pad in less than an hour or pass blood clots bigger than a golf ball. These may be signs that you are bleeding too much.    Blood clots in the legs or lungs    After you give birth, your body naturally clots its blood to help prevent blood loss. Sometimes this increased clotting can happen in other areas of the body, like the legs or lungs. This can block your blood flow and be very dangerous.     Call your provider if you:  Have a red, swollen spot on the back of your leg that is warm or painful when you touch it.   Are coughing up blood.     Infection    Call your provider if you have any of these symptoms:  Fever of 100.4 F (38 C) or higher.  Pain or redness around your stitches if you had an incision.   Any yellow, white, or green fluid coming from places where you had stitches or surgery.    Mood Problems (postpartum depression)    Many people feel sad or have mood changes after having a baby. But for some people, these mood swings are worse.     Call your provider right away if you feel so anxious or nervous that you can't care for yourself or your baby.    Preeclampsia (high blood pressure)    Even if you  "didn't have high blood pressure when you were pregnant, you are at risk for the high blood pressure disease called preeclampsia. This risk can last up to 12 weeks after giving birth.     Call your provider if you have:   Pain on your right side under your rib cage  Sudden swelling in the hands and face    Remember: You know your body. If something doesn't feel right, get medical help.     For informational purposes only. Not to replace the advice of your health care provider. Copyright 2020 [facility]. All rights reserved. Clinically reviewed by Sofi Ocampo, RNC-OB, MSN. Zong [id number] - Rev .    Postpartum Care at Home With Your Baby: Care Instructions  Overview     After childbirth (postpartum period), your body goes through many changes as you recover. In these weeks after delivery, try to take good care of yourself. Get rest whenever you can and accept help from others.  It may take 4 to 6 weeks to feel like yourself again, and possibly longer if you had a  birth. You may feel sore or very tired as you recover. After delivery, you may continue to have contractions as the uterus returns to the size it was before your pregnancy. You will also have some vaginal bleeding. And you may have pain around the vagina as you heal. Several days after delivery you may also have pain and swelling in your breasts as they fill with milk. There are things you can do at home to help ease these discomforts.  After childbirth, it's common to feel emotional. You may feel irritable, cry easily, and feel happy one minute and sad the next. This is called the \"baby blues.\" Hormone changes are one cause of these emotional changes. These feelings usually get better within a couple of weeks. If they don't, talk to your doctor or midwife.  In the first couple of weeks after you give birth, your doctor or midwife may want to check in with you and make a plan for follow-up care. You will likely have a " complete postpartum visit in the first 3 months after delivery. At that time, your doctor or midwife will check on your recovery and see how you're doing. But if you have questions or concerns before then, you can always call your doctor or midwife.  Follow-up care is a key part of your treatment and safety. Be sure to make and go to all appointments, and call your doctor if you are having problems. It's also a good idea to know your test results and keep a list of the medicines you take.  How can you care for yourself at home?  Taking care of your body  Use pads instead of tampons for bleeding. After birth, you will have bloody vaginal discharge. You may also pass some blood clots that shouldn't be bigger than an egg. Over the next 6 weeks or so, your bleeding should decrease a little every day and slowly change to a pinkish and then whitish discharge.  For cramps or mild pain, try an over-the-counter pain medicine, such as acetaminophen (Tylenol) or ibuprofen (Advil, Motrin). Read and follow all instructions on the label.  To ease pain around the vagina or from hemorrhoids:  Put ice or a cold pack on the area for 10 to 20 minutes at a time. Put a thin cloth between the ice and your skin.  Try sitting in a few inches of warm water (sitz bath) when you can or after bowel movements.  Clean yourself with a gentle squeeze of warm water from a bottle instead of wiping with toilet paper.  Use witch hazel or hemorrhoid pads (such as Tucks).  Try using a cold compress for sore and swollen breasts. And wear a supportive bra that fits.  Ease constipation by drinking plenty of fluids and eating high-fiber foods. Ask your doctor or midwife about over-the-counter stool softeners.  Activity  Rest when you can.  Ask for help from family or friends when you need it.  If you can, have another adult in your home for at least 2 or 3 days after birth.  When you feel ready, try to get some exercise every day. For many people, walking  is a good choice. Don't do any heavy exercise until your doctor or midwife says it's okay.  Ask your doctor or midwife when it is okay to have vaginal sex.  If you don't want to get pregnant, talk to your doctor or midwife about birth control options. You can get pregnant even before your period returns. Also, you can get pregnant while you are breastfeeding.  Talk to your doctor or midwife if you want to get pregnant again. They can talk to you about when it is safe.  Emotional health  It's normal to have some sadness, anxiety, and mood swings after delivery. It may help to talk with a trusted friend or family member. You can also call the Maternal Mental Health Hotline at 1-672-[facility]X-[facility] (1-823.291.3517) for support. If these mood changes last more than a couple of weeks, talk to your doctor or midwife.  When should you call for help?  Share this information with your partner, family, or a friend. They can help you watch for warning signs.  Call 911  anytime you think you may need emergency care. For example, call if:    You feel you cannot stop from hurting yourself, your baby, or someone else.     You passed out (lost consciousness).     You have chest pain, are short of breath, or cough up blood.     You have a seizure.   Where to get help 24 hours a day, 7 days a week   If you or someone you know talks about suicide, self-harm, a mental health crisis, a substance use crisis, or any other kind of emotional distress, get help right away. You can:    Call the Suicide and Crisis Lifeline at 128.     Call 7-135-519-TALK (1-608.399.2610).     Text HOME to 902425 to access the Crisis Text Line.   Consider saving these numbers in your phone.  Go to [facility] for more information or to chat online.  Call your doctor or midwife now or seek immediate medical care if:    You have signs of hemorrhage (too much bleeding), such as:  Heavy vaginal bleeding. This means that you are soaking through one or more pads in an  "hour. Or you pass blood clots bigger than an egg.  Feeling dizzy or lightheaded, or you feel like you may faint.  Feeling so tired or weak that you cannot do your usual activities.  A fast or irregular heartbeat.  New or worse belly pain.     You have signs of infection, such as:  A fever.  Increased pain, swelling, warmth, or redness from an incision or wound.  Frequent or painful urination or blood in your urine.  Vaginal discharge that smells bad.  New or worse belly pain.     You have symptoms of a blood clot in your leg (called a deep vein thrombosis), such as:  Pain in the calf, back of the knee, thigh, or groin.  Swelling in the leg or groin.  A color change on the leg or groin. The skin may be reddish or purplish, depending on your usual skin color.     You have signs of preeclampsia, such as:  Sudden swelling of your face, hands, or feet.  New vision problems (such as dimness, blurring, or seeing spots).  A severe headache.     You have signs of heart failure, such as:  New or increased shortness of breath.  New or worse swelling in your legs, ankles, or feet.  Sudden weight gain, such as more than 2 to 3 pounds in a day or 5 pounds in a week.  Feeling so tired or weak that you cannot do your usual activities.     You had spinal or epidural pain relief and have:  New or worse back pain.  Increased pain, swelling, warmth, or redness at the injection site.  Tingling, weakness, or numbness in your legs or groin.   Watch closely for changes in your health, and be sure to contact your doctor or midwife if:    Your vaginal bleeding isn't decreasing.     You feel sad, anxious, or hopeless for more than a few days.     You are having problems with your breasts or breastfeeding.   Where can you learn more?  Go to https://www.healthwise.net/patiented  Enter Z768 in the search box to learn more about \"Postpartum Care at Home With Your Baby: Care Instructions.\"  Current as of: April 30, 2024  Content Version: 14.3    " 2024 [employer].   Care instructions adapted under license by your healthcare professional. If you have questions about a medical condition or this instruction, always ask your healthcare professional. [employer] disclaims any warranty or liability for your use of this information.    Checking Your Blood Pressure at Home  During and after pregnancy  How do I measure my blood pressure?  It s important to take the readings at the same time each day, such as morning and evening. Take your blood pressure before taking any morning medications.  How to get the most accurate reading  30 minutes before checking your blood pressure, avoid the following:  Drinking caffeine  Drinking alcohol  Eating  Smoking  Exercising  5 minutes before checking your blood pressure:  Use the bathroom and urinate so you have an empty bladder.  Sit still in a chair for around 5 minutes. Stay calm and relaxed and do not talk if possible.  To check your blood pressure:     Sit up straight in a chair.  Place your feet on the floor. Don t cross your ankles or legs.  Rest your arm at the level of your heart on a table or desk or on the arm of a chair. Use the same arm every day.  Pull up your shirt sleeve. Don t take the measurement over clothes.  Wrap the blood pressure cuff around the upper part of your left arm, 1 inch (2.5 cm) above your elbow.  Fit the cuff snugly around your arm. You should be able to place only one finger between the cuff and your arm.  Position the cord so that it rests in the bend of your elbow.  Press the power button.  Sit quietly while the cuff inflates and deflates.  Read the digital reading on the monitor screen and write the numbers down (record them) in a notebook.  Wait 2-3 minutes, then repeat the steps, starting at step 1.  Which features do you need?  Arm cuff monitors give the most exact readings.  Wrist and finger blood pressure monitors are often less exact.  Pick a blood pressure  "monitor that has passed tests to show they measure exactly. Blood pressure cuffs for sale in the [place] that have passed tests are listed on the website www.validatebp.org.  Some monitors that have passed tests are:  Omron 3 Series Upper Arm Blood Pressure Monitor (Model YW5204)  Omron 5 Series Upper Arm Blood Pressure Monitor (Model XX1997)  Omron 7 Series Upper Arm Blood Pressure Monitor (Model HEM-7320)  [employer] Upper Arm Blood Pressure Monitor with Talking Function (UA 1030T)  Don t use smartphone apps. There are many smartphone apps that claim to check your blood pressure using the pulse in your wrist or finger. These don t work. They haven t passed any tests. Don t give your clinic a blood pressure reading from a smartphone paul.  If you have a flexible spending account (FSA) or health savings account (HSA), you may wish to pay yourself back (reimburse) for the machine and cuff. A blood pressure monitor is an allowed over-the- counter (OTC) item to pay yourself back from these accounts.  Cuff size  The size of the arm cuff is a key feature. Make sure the cuff is the right size for your arm. If the cuff isn t the right size, readings will either be too high or low.  To know what size cuff to buy, measure the distance around your bicep (upper arm).  Use a flexible measuring tape or . Place the measuring tape nursing home between your armpit and elbow. Measure the distance around your arm in inches.  You may need to buy a cuff apart from the machine to get the right size.  Cuff sizes and arm measurements  Small adult: 22 to 26 cm (8.7 to 10.2 inches)  Adult: 27 to 34 cm (10.6 to 13.4 inches)  Large adult: 35 to 44 cm (13.8 to 17.3 inches)  Adult thigh: 45 to 52 cm (17.7 to 20.5 inches)    Copyright statement\" content=\"For informational purposes only. Not to replace the advice of your health care provider. Photo: ID [id number]   [employer]. Text copyright   2023 [facility]. " All rights reserved. Clinically reviewed by [facility]. [employer] [id number] - REV 03/23.

## 2025-01-23 NOTE — DISCHARGE SUMMARY
OB Discharge Summary      Date:  2025    Name:  Hilda Escamilla  :  2000  MRN:  [id number]      Admission Date:  2025  Delivery Date: 2025  Gestational Age at Delivery:  37w5d  Discharge Date:  2025    Principal Diagnosis:    Patient Active Problem List   Diagnosis    Encounter for triage in pregnant patient    Encounter for induction of labor    Gestational hypertension         Conditions complicating Pregnancy:   Hx kidney stone including in pregnancy. Has needed stents in the past  Anxiety/depression - non pharm tx  BMI 34  Hx hyperemesis in early pregnancy  Needs pap PP  Had 2 diastolic elevations above 90 in early pregnancy       Procedure(s) Performed:  NSVB    Indication for :    Indication for Induction:  IOL     Condition at Discharge:  stable with acute blood loss anemia    Discharge Medications:      Review of your medicines        START taking        Dose / Directions   docusate sodium 100 MG capsule  Commonly known as: COLACE  Used for: Care and examination of lactating mother      Dose: 100 mg  Take 1 capsule (100 mg) by mouth daily.  Refills: 0     ibuprofen 800 MG tablet  Commonly known as: ADVIL/MOTRIN  Used for: Care and examination of lactating mother      Dose: 800 mg  Take 1 tablet (800 mg) by mouth every 6 hours as needed for other (cramping).  Refills: 0            CONTINUE these medicines which may have CHANGED, or have new prescriptions. If we are uncertain of the size of tablets/capsules you have at home, strength may be listed as something that might have changed.        Dose / Directions   * acetaminophen 325 MG tablet  Commonly known as: TYLENOL  This may have changed: You were already taking a medication with the same name, and this prescription was added. Make sure you understand how and when to take each.  Used for: Care and examination of lactating mother      Dose: 650 mg  Take 2 tablets (650 mg) by mouth every 4 hours as needed for mild  pain or fever (greater than or equal to 38 C /100.4 F (oral) or 38.5 C/ 101.4 F (core).).  Refills: 0     * acetaminophen 325 MG tablet  Commonly known as: TYLENOL  This may have changed: Another medication with the same name was added. Make sure you understand how and when to take each.      Dose: 325-650 mg  Take 325-650 mg by mouth every 6 hours as needed for mild pain.  Refills: 0           * This list has 2 medication(s) that are the same as other medications prescribed for you. Read the directions carefully, and ask your doctor or other care provider to review them with you.                CONTINUE these medicines which have NOT CHANGED        Dose / Directions   ondansetron 8 MG ODT tab  Commonly known as: ZOFRAN ODT      Dose: 8 mg  Take 8 mg by mouth.  Refills: 0     pantoprazole 40 MG EC tablet  Commonly known as: PROTONIX      Dose: 40 mg  Take 40 mg by mouth daily.  Refills: 0     PNV Prenatal Plus Multivitamin 27-1 MG Tabs per tablet      Dose: 1 tablet  Take 1 tablet by mouth daily.  Refills: 0               Where to get your medicines        Some of these will need a paper prescription and others can be bought over the counter. Ask your nurse if you have questions.    You don't need a prescription for these medications  acetaminophen 325 MG tablet  docusate sodium 100 MG capsule  ibuprofen 800 MG tablet          Discharge Plan:    Follow up with /OPAL: Monday for BP check with MD and then 6 weeks and as needed   Patient Instructions:      Physical activity: As tolerated. Nothing in the vagina for 6 weeks.      Diet:  regular    Medication: see OTC meds. Continue to take oral iron    Other:        Physician/HEMANTM: Ankit Farah CNM    Name:  Hilda Escamilla  :  2000  MRN:  [id number]

## 2025-01-23 NOTE — PLAN OF CARE
Goal Outcome Evaluation:    Problem: Postpartum (Vaginal Delivery)  Goal: Optimal Pain Control and Function  Outcome: Progressing     Problem: Breastfeeding  Goal: Effective Breastfeeding  Outcome: Progressing     Vitals, fundal assessment, and lochia wdl. Patient is ambulating and voiding independently. Pain is being managed by ibuprofen and tylenol. Bonding well with infant. Breastfeeding is going well. Infant is feeding every 2-3 hours or on demand. Significant other Sheldon at bedside.

## 2025-01-23 NOTE — PLAN OF CARE
Patient discharged home around 1600 with significant other and infant. Discharge education completed and AVS printed and reviewed. All personal belongings were returned to patient. Patient verbalized and demonstrated understanding and had no further questions or concerns. Patient declined wheelchair transportation and ambulated to [facility] accompanied by hospital staff.

## 2025-01-23 NOTE — PROGRESS NOTES
Outreach Note for MACKENZIE Escamilla  [id number]  2000    Discharge follow-up plan discussed with patient, needs assessed. Pt requests all follow-up through clinic/physician, declines home care visit, unless medically indicated and ordered by physician, and declines follow-up phone call.   No further needs identified at this time.

## 2025-01-25 ENCOUNTER — PATIENT OUTREACH (OUTPATIENT)
Dept: CARE COORDINATION | Facility: CLINIC | Age: 25
End: 2025-01-25
Payer: COMMERCIAL

## 2025-01-25 NOTE — PROGRESS NOTES
Clinic Care Coordination Contact  Transitions of Care Outreach    Chief Complaint   Patient presents with    Clinic Care Coordination - Post Hospital       Most Recent Admission Date: 1/20/2025   Most Recent Admission Diagnosis: Encounter for induction of labor - Z34.90     Most Recent Discharge Date: 1/23/2025   Most Recent Discharge Diagnosis: Care and examination of lactating mother - Z39.1     Transitions of Care Assessment    Discharge Assessment  How are you doing now that you are home?: Pt stated that she is doing well with baby. declined needs to assist her in scheduling f/u appt.  How are your symptoms? (Red Flag symptoms escalate to triage hotline per guidelines): Improved  Do you know how to contact your clinic care team if you have future questions or changes to your health status? : Yes  Does the patient have their discharge instructions? : Yes  Does the patient have questions regarding their discharge instructions? : No  Were you started on any new medications or were there changes to any of your previous medications? : Yes  Does the patient have all of their medications?: Yes  Do you have questions regarding any of your medications? : No  Do you have all of your needed medical supplies or equipment (DME)?  (i.e. oxygen tank, CPAP, cane, etc.): Yes    Post-op ([facility]A Only)  If the patient had a surgery or procedure, do they have any questions for a nurse?: No         Follow up Plan     No future appointments.  Outpatient Plan as outlined on AVS reviewed with patient.      For any urgent concerns, please contact our 24 hour nurse triage line: [phone]       Millicent, CHW  [phone]  [facility]

## 2025-03-16 ENCOUNTER — HEALTH MAINTENANCE LETTER (OUTPATIENT)
Age: 25
End: 2025-03-16